# Patient Record
Sex: FEMALE | Race: WHITE | Employment: OTHER | URBAN - METROPOLITAN AREA
[De-identification: names, ages, dates, MRNs, and addresses within clinical notes are randomized per-mention and may not be internally consistent; named-entity substitution may affect disease eponyms.]

---

## 2017-12-27 ENCOUNTER — APPOINTMENT (OUTPATIENT)
Dept: GENERAL RADIOLOGY | Age: 72
End: 2017-12-27
Attending: EMERGENCY MEDICINE
Payer: MEDICARE

## 2017-12-27 ENCOUNTER — APPOINTMENT (OUTPATIENT)
Dept: CT IMAGING | Age: 72
End: 2017-12-27
Attending: EMERGENCY MEDICINE
Payer: MEDICARE

## 2017-12-27 ENCOUNTER — HOSPITAL ENCOUNTER (EMERGENCY)
Age: 72
Discharge: HOME | End: 2017-12-27
Attending: EMERGENCY MEDICINE
Payer: MEDICARE

## 2017-12-27 VITALS
WEIGHT: 158.95 LBS | SYSTOLIC BLOOD PRESSURE: 91 MMHG | DIASTOLIC BLOOD PRESSURE: 55 MMHG | BODY MASS INDEX: 26.48 KG/M2 | OXYGEN SATURATION: 96 % | TEMPERATURE: 97 F | RESPIRATION RATE: 16 BRPM | HEIGHT: 65 IN | HEART RATE: 59 BPM

## 2017-12-27 DIAGNOSIS — S42.291A OTHER CLOSED DISPLACED FRACTURE OF PROXIMAL END OF RIGHT HUMERUS, INITIAL ENCOUNTER: Primary | ICD-10-CM

## 2017-12-27 PROCEDURE — 73200 CT UPPER EXTREMITY W/O DYE: CPT

## 2017-12-27 PROCEDURE — 74011250636 HC RX REV CODE- 250/636: Performed by: EMERGENCY MEDICINE

## 2017-12-27 PROCEDURE — A4565 SLINGS: HCPCS

## 2017-12-27 PROCEDURE — 96372 THER/PROPH/DIAG INJ SC/IM: CPT

## 2017-12-27 PROCEDURE — 73030 X-RAY EXAM OF SHOULDER: CPT

## 2017-12-27 PROCEDURE — 99283 EMERGENCY DEPT VISIT LOW MDM: CPT

## 2017-12-27 PROCEDURE — 74011250637 HC RX REV CODE- 250/637: Performed by: EMERGENCY MEDICINE

## 2017-12-27 PROCEDURE — L3650 SO 8 ABD RESTRAINT PRE OTS: HCPCS

## 2017-12-27 RX ORDER — ONDANSETRON 4 MG/1
8 TABLET, ORALLY DISINTEGRATING ORAL
Status: COMPLETED | OUTPATIENT
Start: 2017-12-27 | End: 2017-12-27

## 2017-12-27 RX ORDER — ONDANSETRON 8 MG/1
8 TABLET, ORALLY DISINTEGRATING ORAL
Qty: 15 TAB | Refills: 0 | Status: SHIPPED | OUTPATIENT
Start: 2017-12-27

## 2017-12-27 RX ORDER — HYDROMORPHONE HYDROCHLORIDE 1 MG/ML
1 INJECTION, SOLUTION INTRAMUSCULAR; INTRAVENOUS; SUBCUTANEOUS
Status: COMPLETED | OUTPATIENT
Start: 2017-12-27 | End: 2017-12-27

## 2017-12-27 RX ORDER — OXYCODONE AND ACETAMINOPHEN 5; 325 MG/1; MG/1
1-2 TABLET ORAL
Qty: 30 TAB | Refills: 0 | Status: SHIPPED | OUTPATIENT
Start: 2017-12-27

## 2017-12-27 RX ADMIN — ONDANSETRON 8 MG: 4 TABLET, ORALLY DISINTEGRATING ORAL at 05:51

## 2017-12-27 RX ADMIN — HYDROMORPHONE HYDROCHLORIDE 1 MG: 1 INJECTION, SOLUTION INTRAMUSCULAR; INTRAVENOUS; SUBCUTANEOUS at 05:52

## 2017-12-27 NOTE — ED TRIAGE NOTES
Pt rpts falling tonight while playing with her puppy and injured her right shoulder. Took 3 Aleve PTA.

## 2017-12-27 NOTE — ED NOTES
The patient was discharged home by Dr Karyle Jakes in stable condition. The patient is alert and oriented, in no respiratory distress and discharge vital signs obtained. The patient's diagnosis, condition and treatment were explained. The patient expressed understanding. Prescriptions given. No work/school note given. A discharge plan has been developed. A  was not involved in the process. Aftercare instructions were given. Pt discharged from the ED via w/c by Papo Wyatt RN with family.

## 2017-12-27 NOTE — ED NOTES
Pt resting on the stretcher in no distress. All results available for review and awaiting further care mgmt. Dr Bonnie Palmer in to speak with pt/spouse regarding test results. Will continue to monitor.

## 2017-12-27 NOTE — DISCHARGE INSTRUCTIONS
We hope that we have addressed all of your medical concerns. The examination and treatment you received in the Emergency Department were for an emergent problem and were not intended as complete care. It is important that you follow up with your healthcare provider(s) for ongoing care. If your symptoms worsen or do not improve as expected, and you are unable to reach your usual health care provider(s), you should return to the Emergency Department. Today's healthcare is undergoing tremendous change, and patient satisfaction surveys are one of the many tools to assess the quality of medical care. You may receive a survey from the [employer] regarding your experience in the Emergency Department. I hope that your experience has been completely positive, particularly the medical care that I provided. As such, please participate in the survey; anything less than excellent does not meet my expectations or intentions. [address] [place] [address] and [address] [place] [address] participate in nationally recognized quality of care measures. If your blood pressure is greater than 120/80, as reported below, we urge that you seek medical care to address the potential of high blood pressure, commonly known as hypertension. Hypertension can be hereditary or can be caused by certain medical conditions, pain, stress, or \"white coat syndrome. \"       Please make an appointment with your health care provider(s) for follow up of your Emergency Department visit. VITALS:   Patient Vitals for the past 8 hrs:   Temp Pulse Resp BP SpO2   12/27/17 0442 97 °F (36.1 °C) 67 16 123/69 97 %          Thank you for allowing us to provide you with medical care today. We realize that you have many choices for your emergency care needs. Please choose us in the future for any continued health care needs. Felipe Jacinto MD    [facility], later [facility].   Office: [phone]            No results found for this or any previous visit (from the past 24 hour(s)). Xr Shoulder Rt Ap/lat Min 2 V    Result Date: 12/27/2017  EXAM:  XR SHOULDER RT AP/LAT MIN 2 V Clinical history: Shoulder trauma INDICATION:   Trauma. COMPARISON: None. FINDINGS: Three views of the right shoulder demonstrate displaced subcapital fracture. Inferomedial displacement of the distal fracture fragment. IMPRESSION:  Displaced proximal right humerus fracture. Broken Arm: Care Instructions  Your Care Instructions  Fractures can range from a small, hairline crack, to a bone or bones broken into two or more pieces. Your treatment depends on how bad the break is. Your doctor may have put your arm in a splint or cast to allow it to heal or to keep it stable until you see another doctor. It may take weeks or months for your arm to heal. You can help your arm heal with some care at home. You heal best when you take good care of yourself. Eat a variety of healthy foods, and don't smoke. You may have had a sedative to help you relax. You may be unsteady after having sedation. It can take a few hours for the medicine's effects to wear off. Common side effects of sedation include nausea, vomiting, and feeling sleepy or tired. The doctor has checked you carefully, but problems can develop later. If you notice any problems or new symptoms, get medical treatment right away. Follow-up care is a key part of your treatment and safety. Be sure to make and go to all appointments, and call your doctor if you are having problems. It's also a good idea to know your test results and keep a list of the medicines you take. How can you care for yourself at home? · If the doctor gave you a sedative:  ¨ For 24 hours, don't do anything that requires attention to detail. It takes time for the medicine's effects to completely wear off.   ¨ For your safety, do not drive or operate any machinery that could be dangerous. Wait until the medicine wears off and you can think clearly and react easily. · Put ice or a cold pack on your arm for 10 to 20 minutes at a time. Try to do this every 1 to 2 hours for the next 3 days (when you are awake). Put a thin cloth between the ice and your cast or splint. Keep the cast or splint dry. · Follow the cast care instructions your doctor gives you. If you have a splint, do not take it off unless your doctor tells you to. · Be safe with medicines. Take pain medicines exactly as directed. ¨ If the doctor gave you a prescription medicine for pain, take it as prescribed. ¨ If you are not taking a prescription pain medicine, ask your doctor if you can take an over-the-counter medicine. · Prop up your arm on pillows when you sit or lie down in the first few days after the injury. Keep the arm higher than the level of your heart. This will help reduce swelling. · Follow instructions for exercises to keep your arm strong. · Wiggle your fingers and wrist often to reduce swelling and stiffness. When should you call for help? Call 911 anytime you think you may need emergency care. For example, call if:  ? · You are very sleepy and you have trouble waking up. ?Call your doctor now or seek immediate medical care if:  ? · You have new or worse nausea or vomiting. ? · You have new or worse pain. ? · Your hand or fingers are cool or pale or change color. ? · Your cast or splint feels too tight. ? · You have tingling, weakness, or numbness in your hand or fingers. ? Watch closely for changes in your health, and be sure to contact your doctor if:  ? · You do not get better as expected. ? · You have problems with your cast or splint. Where can you learn more? Go to http://mango-phyllis.info/. Enter V424 in the search box to learn more about \"Broken Arm: Care Instructions. \"  Current as of: March 21, 2017  Content Version: 11.4  © 1439-0746 [employer]. Care instructions adapted under license by [employer] (which disclaims liability or warranty for this information). If you have questions about a medical condition or this instruction, always ask your healthcare professional. [address] any warranty or liability for your use of this information.

## 2017-12-27 NOTE — ED PROVIDER NOTES
HPI Comments: The patient is a 73-year-old female, who presents to the ED with a complaint of right arm and shoulder injury sustained after she fell while attempting to restrain her dog approximately 5 hours ago. The patient denies any loss of consciousness, headache, neck pain, back pain, chest pain, shortness of breath, nausea, vomiting, abdominal pain, diarrhea, constipation, dysuria, hematuria, dizziness, weakness, or numbness. The patient is right-handed. She has been taken Aleve at home without any significant relief of symptoms. Patient is a 71 y.o. female presenting with shoulder injury. Shoulder Injury           Past Medical History:   Diagnosis Date    Hypertension     Ill-defined condition     elevated cholesterol       History reviewed. No pertinent surgical history. History reviewed. No pertinent family history. Social History     Social History    Marital status:      Spouse name: N/A    Number of children: N/A    Years of education: N/A     Occupational History    Not on file. Social History Main Topics    Smoking status: Never Smoker    Smokeless tobacco: Never Used    Alcohol use No    Drug use: Not on file    Sexual activity: Not on file     Other Topics Concern    Not on file     Social History Narrative    No narrative on file         ALLERGIES: Review of patient's allergies indicates no known allergies. Review of Systems   All other systems reviewed and are negative. Vitals:    12/27/17 0442   BP: 123/69   Pulse: 67   Resp: 16   Temp: 97 °F (36.1 °C)   SpO2: 97%   Weight: 72.1 kg (158 lb 15.2 oz)   Height: 5' 5\" (1.651 m)            Physical Exam   Nursing note and vitals reviewed. CONSTITUTIONAL: Well-appearing; well-nourished; in mild distress  HEAD: Normocephalic; atraumatic  EYES: PERRL; EOM intact; conjunctiva and sclera are clear bilaterally.   ENT: No rhinorrhea; normal pharynx with no tonsillar hypertrophy; mucous membranes pink/moist, no erythema, no exudate. NECK: Supple; non-tender; no cervical lymphadenopathy  CARD: Normal S1, S2; no murmurs, rubs, or gallops. Regular rate and rhythm. RESP: Normal respiratory effort; breath sounds clear and equal bilaterally; no wheezes, rhonchi, or rales. ABD: Normal bowel sounds; non-distended; non-tender; no palpable organomegaly, no masses, no bruits. Back Exam: Normal inspection; no vertebral point tenderness, no CVA tenderness. Normal range of motion. EXT: Decreased ROM/ Tenderness in right shoulder with right arm held in adduction; no deformity or swelling; distal pulses are normal, no edema. SKIN: Warm; dry; no rash. NEURO:Alert and oriented x 3, coherent, TEODORO-XII grossly intact, sensory and motor are non-focal.        MDM  Number of Diagnoses or Management Options  Diagnosis management comments: Assessment: Right shoulder injury-rule out fracture, dislocation    Plan: Analgesia/ x-ray of the right shoulder/ sling/ splint/. Serial exam/ monitor and reevaluate          Amount and/or Complexity of Data Reviewed  Clinical lab tests: ordered and reviewed  Tests in the radiology section of CPT®: ordered and reviewed  Tests in the medicine section of CPT®: reviewed and ordered  Discussion of test results with the performing providers: yes  Decide to obtain previous medical records or to obtain history from someone other than the patient: yes  Obtain history from someone other than the patient: yes  Review and summarize past medical records: yes  Discuss the patient with other providers: yes    Risk of Complications, Morbidity, and/or Mortality  Presenting problems: low  Diagnostic procedures: low  Management options: low      ED Course       Procedures     XRAY INTERPRETATION (ED MD)  Xray of Right Shoulder shows Three views of the right shoulder demonstrate displaced subcapital  fracture. Inferomedial displacement of the distal fracture fragment.  Joselin Sainz MD 4:53 AM    CONSULT NOTE:  Subhash Crews Stefanie Madison MD spoke with Dr. Saintclair Gash of [facility] for Dr. Narinder Mccloud. Discussed patient's presentation, history, physical assessment, and available diagnostic results. Wants patient to get a CT scan of right shoulder, Sling and discharged home and will follow up in the office for outpatient reevaluation and work-up as deemed appropriate. Progress Note:   Pt has been reexamined by Josette Crump MD. Pt is feeling much better. Symptoms have improved. All available results have been reviewed with pt and any available family. Pt understands sx, dx, and tx in ED. Care plan has been outlined and questions have been answered. Pt is ready to go home. Will send home on *Right shoulder fracture instruction. Prescription of Percocet. Outpatient referral with Ortho in 1-2 days for reevaluation and further treatment including elective surgical repair. Written by Josette Crump MD,4:59 AM    .   .

## 2021-03-30 NOTE — ED NOTES
Additional treatment plan of care discussed and pt medicated per orders. Pt to CT. Solaraze Pregnancy And Lactation Text: This medication is Pregnancy Category B and is considered safe. There is some data to suggest avoiding during the third trimester. It is unknown if this medication is excreted in breast milk.

## 2022-11-19 ENCOUNTER — HOSPITAL ENCOUNTER (INPATIENT)
Age: 77
LOS: 6 days | Discharge: HOME HEALTH | DRG: 493 | End: 2022-11-25
Attending: EMERGENCY MEDICINE | Admitting: HOSPITALIST
Payer: MEDICARE

## 2022-11-19 ENCOUNTER — APPOINTMENT (OUTPATIENT)
Dept: CT IMAGING | Age: 77
DRG: 493 | End: 2022-11-19
Attending: PHYSICIAN ASSISTANT
Payer: MEDICARE

## 2022-11-19 ENCOUNTER — APPOINTMENT (OUTPATIENT)
Dept: GENERAL RADIOLOGY | Age: 77
DRG: 493 | End: 2022-11-19
Attending: EMERGENCY MEDICINE
Payer: MEDICARE

## 2022-11-19 DIAGNOSIS — S42.291A OTHER CLOSED DISPLACED FRACTURE OF PROXIMAL END OF RIGHT HUMERUS, INITIAL ENCOUNTER: ICD-10-CM

## 2022-11-19 DIAGNOSIS — S82.142A TIBIAL PLATEAU FRACTURE, LEFT, CLOSED, INITIAL ENCOUNTER: Primary | ICD-10-CM

## 2022-11-19 PROBLEM — S82.209A TIBIA FRACTURE: Status: ACTIVE | Noted: 2022-11-19

## 2022-11-19 LAB
ALBUMIN SERPL-MCNC: 3.8 G/DL (ref 3.5–5)
ALBUMIN/GLOB SERPL: 1 {RATIO} (ref 1.1–2.2)
ALP SERPL-CCNC: 72 U/L (ref 45–117)
ALT SERPL-CCNC: 23 U/L (ref 12–78)
ANION GAP SERPL CALC-SCNC: 5 MMOL/L (ref 5–15)
AST SERPL-CCNC: 21 U/L (ref 15–37)
BASOPHILS # BLD: 0 K/UL (ref 0–0.1)
BASOPHILS NFR BLD: 0 % (ref 0–1)
BILIRUB SERPL-MCNC: 0.9 MG/DL (ref 0.2–1)
BUN SERPL-MCNC: 13 MG/DL (ref 6–20)
BUN/CREAT SERPL: 17 (ref 12–20)
CALCIUM SERPL-MCNC: 9.6 MG/DL (ref 8.5–10.1)
CHLORIDE SERPL-SCNC: 104 MMOL/L (ref 97–108)
CO2 SERPL-SCNC: 29 MMOL/L (ref 21–32)
CREAT SERPL-MCNC: 0.75 MG/DL (ref 0.55–1.02)
DIFFERENTIAL METHOD BLD: ABNORMAL
EOSINOPHIL # BLD: 0.1 K/UL (ref 0–0.4)
EOSINOPHIL NFR BLD: 1 % (ref 0–7)
ERYTHROCYTE [DISTWIDTH] IN BLOOD BY AUTOMATED COUNT: 12.9 % (ref 11.5–14.5)
GLOBULIN SER CALC-MCNC: 3.7 G/DL (ref 2–4)
GLUCOSE SERPL-MCNC: 119 MG/DL (ref 65–100)
HCT VFR BLD AUTO: 39 % (ref 35–47)
HGB BLD-MCNC: 12.6 G/DL (ref 11.5–16)
IMM GRANULOCYTES # BLD AUTO: 0 K/UL (ref 0–0.04)
IMM GRANULOCYTES NFR BLD AUTO: 0 % (ref 0–0.5)
INR PPP: 1 (ref 0.9–1.1)
LYMPHOCYTES # BLD: 0.8 K/UL (ref 0.8–3.5)
LYMPHOCYTES NFR BLD: 10 % (ref 12–49)
MCH RBC QN AUTO: 30.5 PG (ref 26–34)
MCHC RBC AUTO-ENTMCNC: 32.3 G/DL (ref 30–36.5)
MCV RBC AUTO: 94.4 FL (ref 80–99)
MONOCYTES # BLD: 0.5 K/UL (ref 0–1)
MONOCYTES NFR BLD: 6 % (ref 5–13)
NEUTS SEG # BLD: 6.9 K/UL (ref 1.8–8)
NEUTS SEG NFR BLD: 83 % (ref 32–75)
NRBC # BLD: 0 K/UL (ref 0–0.01)
NRBC BLD-RTO: 0 PER 100 WBC
PLATELET # BLD AUTO: 216 K/UL (ref 150–400)
PMV BLD AUTO: 9.4 FL (ref 8.9–12.9)
POTASSIUM SERPL-SCNC: 3.8 MMOL/L (ref 3.5–5.1)
PROT SERPL-MCNC: 7.5 G/DL (ref 6.4–8.2)
PROTHROMBIN TIME: 10.2 SEC (ref 9–11.1)
RBC # BLD AUTO: 4.13 M/UL (ref 3.8–5.2)
SODIUM SERPL-SCNC: 138 MMOL/L (ref 136–145)
WBC # BLD AUTO: 8.4 K/UL (ref 3.6–11)

## 2022-11-19 PROCEDURE — 99285 EMERGENCY DEPT VISIT HI MDM: CPT

## 2022-11-19 PROCEDURE — 73562 X-RAY EXAM OF KNEE 3: CPT

## 2022-11-19 PROCEDURE — 85025 COMPLETE CBC W/AUTO DIFF WBC: CPT

## 2022-11-19 PROCEDURE — 80053 COMPREHEN METABOLIC PANEL: CPT

## 2022-11-19 PROCEDURE — 65270000029 HC RM PRIVATE

## 2022-11-19 PROCEDURE — 86900 BLOOD TYPING SEROLOGIC ABO: CPT

## 2022-11-19 PROCEDURE — 74011250636 HC RX REV CODE- 250/636: Performed by: EMERGENCY MEDICINE

## 2022-11-19 PROCEDURE — 93005 ELECTROCARDIOGRAM TRACING: CPT

## 2022-11-19 PROCEDURE — 96374 THER/PROPH/DIAG INJ IV PUSH: CPT

## 2022-11-19 PROCEDURE — 85610 PROTHROMBIN TIME: CPT

## 2022-11-19 PROCEDURE — 73700 CT LOWER EXTREMITY W/O DYE: CPT

## 2022-11-19 PROCEDURE — 77030036660

## 2022-11-19 PROCEDURE — 74011250637 HC RX REV CODE- 250/637: Performed by: HOSPITALIST

## 2022-11-19 PROCEDURE — 36415 COLL VENOUS BLD VENIPUNCTURE: CPT

## 2022-11-19 RX ORDER — FENTANYL CITRATE 50 UG/ML
100 INJECTION, SOLUTION INTRAMUSCULAR; INTRAVENOUS ONCE
Status: COMPLETED | OUTPATIENT
Start: 2022-11-19 | End: 2022-11-19

## 2022-11-19 RX ORDER — OXYCODONE AND ACETAMINOPHEN 5; 325 MG/1; MG/1
1 TABLET ORAL
Status: DISCONTINUED | OUTPATIENT
Start: 2022-11-19 | End: 2022-11-20

## 2022-11-19 RX ORDER — OXYCODONE AND ACETAMINOPHEN 5; 325 MG/1; MG/1
1-2 TABLET ORAL
Status: DISCONTINUED | OUTPATIENT
Start: 2022-11-19 | End: 2022-11-19

## 2022-11-19 RX ORDER — VALSARTAN 80 MG/1
160 TABLET ORAL DAILY
Status: DISCONTINUED | OUTPATIENT
Start: 2022-11-20 | End: 2022-11-25 | Stop reason: HOSPADM

## 2022-11-19 RX ORDER — ATORVASTATIN CALCIUM 10 MG/1
10 TABLET, FILM COATED ORAL
Status: DISCONTINUED | OUTPATIENT
Start: 2022-11-19 | End: 2022-11-25 | Stop reason: HOSPADM

## 2022-11-19 RX ORDER — HYDROMORPHONE HYDROCHLORIDE 1 MG/ML
1 INJECTION, SOLUTION INTRAMUSCULAR; INTRAVENOUS; SUBCUTANEOUS
Status: DISCONTINUED | OUTPATIENT
Start: 2022-11-19 | End: 2022-11-25 | Stop reason: HOSPADM

## 2022-11-19 RX ORDER — HYDROMORPHONE HYDROCHLORIDE 1 MG/ML
1 INJECTION, SOLUTION INTRAMUSCULAR; INTRAVENOUS; SUBCUTANEOUS ONCE
Status: COMPLETED | OUTPATIENT
Start: 2022-11-19 | End: 2022-11-19

## 2022-11-19 RX ORDER — ONDANSETRON 2 MG/ML
4 INJECTION INTRAMUSCULAR; INTRAVENOUS
Status: DISCONTINUED | OUTPATIENT
Start: 2022-11-19 | End: 2022-11-25 | Stop reason: HOSPADM

## 2022-11-19 RX ADMIN — FENTANYL CITRATE 100 MCG: 50 INJECTION, SOLUTION INTRAMUSCULAR; INTRAVENOUS at 15:52

## 2022-11-19 RX ADMIN — OXYCODONE AND ACETAMINOPHEN 1 TABLET: 5; 325 TABLET ORAL at 20:22

## 2022-11-19 RX ADMIN — HYDROMORPHONE HYDROCHLORIDE 1 MG: 1 INJECTION, SOLUTION INTRAMUSCULAR; INTRAVENOUS; SUBCUTANEOUS at 16:53

## 2022-11-19 RX ADMIN — ATORVASTATIN CALCIUM 10 MG: 10 TABLET, FILM COATED ORAL at 20:22

## 2022-11-19 NOTE — H&P
Stefan Olguin [facility] [place] [address]  HISTORY AND PHYSICAL    Name:  Brii Carranza  MR#:  [id number]  :  1945  ACCOUNT #:  [de-identified]  ADMIT DATE:  2022    PRIMARY CARE PHYSICIAN:  Unknown. PRESENTING COMPLAINT:  Left knee pain. HISTORY OF PRESENT ILLNESS:      The patient is a 42-year-old female who has previous history significant for hypertension and hyperlipidemia, who came to the emergency room due to left knee pain. The patient stated that she was playing  with her dog when she got run into other [place] retriever and was knocked to the ground. The patient presented to ER due to left knee pain. According to the EMS, the patient's left knee had a valgus deformity to the left knee. The patient was given a dose of fentanyl. In the emergency room, an x-ray was done which showed fracture of proximal tibia and fibula. PAST MEDICAL HISTORY:      1. Hypertension. 2.  Hyper lipidemia. MEDICATIONS PRIOR TO ADMISSION:  Include:  1. Lipitor 10 mg daily. 2.  Candesartan one tablet 16 mg daily. ALLERGIES:  NO KNOWN DRUG ALLERGIES. SOCIOECONOMIC HISTORY:  The patient does not smoke or use any drugs. She drinks socially. She is . FAMILY HISTORY:  Significant for diabetes type 1 and congestive heart failure. REVIEW OF SYSTEMS:  Review of systems is negative except as mentioned in history presenting illness. All system are reviewed. No other positive finding was noticed. PHYSICAL EXAMINATION:    GENERAL APPEARANCE:  The patient is a 42-year-old female not in any acute distress. VITAL SIGNS:  Temperature 98.9, blood pressure is 132/60, pulse is 60, respiratory rate is 18, saturation is 100%. HEENT:  Pupils equally reactive to light and accommodation. NECK:  Supple. There is no lymphadenopathy or JVD. CHEST:  Chest is clear. No wheezing, no crackles. CARDIOVASCULAR SYSTEM:  S1 and S2 regular. No murmur.   No S3.  ABDOMEN:  Abdominal examination reveals no tenderness, no guarding, no rigidity. Bowel sounds are active. EXTREMITIES:  Extremity examination reveals tenderness in the left knee area. DIAGNOSTIC DATA:  Labs are not done. X-ray shows fracture of proximal tibia and fibula. ASSESSMENT AND PLAN:      The patient is a 71-year-old lady with previous history significant for hypertension, hyperlipidemia, is admitted due to impacted tibial plateau fracture as seen on x-ray. Orthopedic has already been consulted. 1.  The patient will be started on a knee immobilizer and not weigh bearing. 2.  Pain control. 3.  Possibly OR tomorrow. 4.  Obtain baseline EKG and labs. 5.  History of hypertension. We will continue on current medication. 6.  Hyperlipidemia. We will continue on statin. 7.  DVT prophylaxis.       Edilma Virgen MD      [email or web address]  D:  11/19/2022 16:51  T:  11/19/2022 18:01  JOB #:  [id number]

## 2022-11-19 NOTE — CONSULTS
ORTHOPEDIC CONSULT    Subjective:     Date of Consultation:  November 19, 2022    Referring Physician:  Dr. Crystal Yanes is a 77 y.o.  female who is being seen for left knee pain. She was playing with her 79 lb Elesa Ceci Retriever earlier today and was knocked to the ground. Brought to ED by EMS and workup has revealed depressed left tibial plateau and fibular head fracture. She is otherwise healthy and only takes medication for HTN and hyperlipidemia. Denies any LOC, head trauma, CP/SOB, N/V or numbness/tingling. Her  is present with her today. She does take ASA 81mg daily. Patient Active Problem List    Diagnosis Date Noted    Tibia fracture 11/19/2022     No family history on file. Social History     Tobacco Use    Smoking status: Never    Smokeless tobacco: Never   Substance Use Topics    Alcohol use: No     Past Medical History:   Diagnosis Date    Hypertension     Ill-defined condition     elevated cholesterol      No past surgical history on file. Prior to Admission medications    Medication Sig Start Date End Date Taking? Authorizing Provider   IRBESARTAN PO Take  by mouth daily. eNvin Durand MD   ATORVASTATIN CALCIUM (ATORVASTATIN PO) Take  by mouth nightly. Nevin Durand MD   oxyCODONE-acetaminophen (PERCOCET) 5-325 mg per tablet Take 1-2 Tabs by mouth every four (4) hours as needed for Pain. Max Daily Amount: 12 Tabs. 12/27/17   Ant Elise MD   ondansetron (ZOFRAN ODT) 8 mg disintegrating tablet Take 1 Tab by mouth every eight (8) hours as needed for Nausea.  12/27/17   Ant Elise MD     Current Facility-Administered Medications   Medication Dose Route Frequency    atorvastatin (LIPITOR) tablet 10 mg  10 mg Oral QHS    [START ON 11/20/2022] valsartan (DIOVAN) tablet 160 mg  160 mg Oral DAILY    ondansetron (ZOFRAN) injection 4 mg  4 mg IntraVENous Q6H PRN    oxyCODONE-acetaminophen (PERCOCET) 5-325 mg per tablet 1 Tablet  1 Tablet Oral Q4H PRN    Or oxyCODONE-acetaminophen (PERCOCET) 5-325 mg per tablet 1 Tablet  1 Tablet Oral Q4H PRN     Current Outpatient Medications   Medication Sig    IRBESARTAN PO Take  by mouth daily. ATORVASTATIN CALCIUM (ATORVASTATIN PO) Take  by mouth nightly. oxyCODONE-acetaminophen (PERCOCET) 5-325 mg per tablet Take 1-2 Tabs by mouth every four (4) hours as needed for Pain. Max Daily Amount: 12 Tabs. ondansetron (ZOFRAN ODT) 8 mg disintegrating tablet Take 1 Tab by mouth every eight (8) hours as needed for Nausea. No Known Allergies     Review of Systems:  A comprehensive review of systems was negative except for that written in the HPI. Objective:     Patient Vitals for the past 8 hrs:   BP Temp Pulse Resp SpO2 Height Weight   22 1800 (!) 121/49 98.4 °F (36.9 °C) 60 16 96 % -- --   22 1547 (!) 132/59 98.9 °F (37.2 °C) (!) 58 18 100 % 5' 5\" (1.651 m) 68 kg (150 lb)     Temp (24hrs), Av.7 °F (37.1 °C), Min:98.4 °F (36.9 °C), Max:98.9 °F (37.2 °C)      Physical Exam  Constitutional:       Appearance:  well-developed, well nourished  HENT:      Head: Normocephalic and atraumatic. Nose: Nose normal.       Mouth: Mucous membranes are moist.   Eyes:      Extraocular Movements:  intact. Conjunctiva/sclera:  normal.   Cardiovascular:      Rate and Rhythm: Normal rate and regular rhythm. Good peripheral pulses  Pulmonary:      Effort: Pulmonary effort is normal. No respiratory distress. Abdominal:      General: non-distended. Musculoskeletal:        Left Leg: Valgus deformity present with knee immobilizer in place. Flexed to 25 degrees and unable to reposition. Mild edema/ mild effusion. No ecchymosis. SILT, + DP/PT pulses. SILT. +PF/DF/EHL. Skin:     General: Skin is warm. Capillary Refill: Capillary refill takes less than 3 seconds. Neurological:      General: No focal deficit present. Mental Status: She is alert and oriented to person, place, and time.       Comments: No gross motor or sensory deficits       Data Review   Recent Results (from the past 24 hour(s))   CBC WITH AUTOMATED DIFF    Collection Time: 11/19/22  5:15 PM   Result Value Ref Range    WBC 8.4 3.6 - 11.0 K/uL    RBC 4.13 3.80 - 5.20 M/uL    HGB 12.6 11.5 - 16.0 g/dL    HCT 39.0 35.0 - 47.0 %    MCV 94.4 80.0 - 99.0 FL    MCH 30.5 26.0 - 34.0 PG    MCHC 32.3 30.0 - 36.5 g/dL    RDW 12.9 11.5 - 14.5 %    PLATELET 489 940 - 608 K/uL    MPV 9.4 8.9 - 12.9 FL    NRBC 0.0 0  WBC    ABSOLUTE NRBC 0.00 0.00 - 0.01 K/uL    NEUTROPHILS 83 (H) 32 - 75 %    LYMPHOCYTES 10 (L) 12 - 49 %    MONOCYTES 6 5 - 13 %    EOSINOPHILS 1 0 - 7 %    BASOPHILS 0 0 - 1 %    IMMATURE GRANULOCYTES 0 0.0 - 0.5 %    ABS. NEUTROPHILS 6.9 1.8 - 8.0 K/UL    ABS. LYMPHOCYTES 0.8 0.8 - 3.5 K/UL    ABS. MONOCYTES 0.5 0.0 - 1.0 K/UL    ABS. EOSINOPHILS 0.1 0.0 - 0.4 K/UL    ABS. BASOPHILS 0.0 0.0 - 0.1 K/UL    ABS. IMM. GRANS. 0.0 0.00 - 0.04 K/UL    DF AUTOMATED     METABOLIC PANEL, COMPREHENSIVE    Collection Time: 11/19/22  5:15 PM   Result Value Ref Range    Sodium 138 136 - 145 mmol/L    Potassium 3.8 3.5 - 5.1 mmol/L    Chloride 104 97 - 108 mmol/L    CO2 29 21 - 32 mmol/L    Anion gap 5 5 - 15 mmol/L    Glucose 119 (H) 65 - 100 mg/dL    BUN 13 6 - 20 MG/DL    Creatinine 0.75 0.55 - 1.02 MG/DL    BUN/Creatinine ratio 17 12 - 20      eGFR >60 >60 ml/min/1.73m2    Calcium 9.6 8.5 - 10.1 MG/DL    Bilirubin, total 0.9 0.2 - 1.0 MG/DL    ALT (SGPT) 23 12 - 78 U/L    AST (SGOT) 21 15 - 37 U/L    Alk.  phosphatase 72 45 - 117 U/L    Protein, total 7.5 6.4 - 8.2 g/dL    Albumin 3.8 3.5 - 5.0 g/dL    Globulin 3.7 2.0 - 4.0 g/dL    A-G Ratio 1.0 (L) 1.1 - 2.2     PROTHROMBIN TIME + INR    Collection Time: 11/19/22  5:15 PM   Result Value Ref Range    INR 1.0 0.9 - 1.1      Prothrombin time 10.2 9.0 - 11.1 sec   EKG, 12 LEAD, INITIAL    Collection Time: 11/19/22  5:32 PM   Result Value Ref Range    Ventricular Rate 66 BPM    Atrial Rate 66 BPM    P-R Interval 202 ms    QRS Duration 94 ms    Q-T Interval 386 ms    QTC Calculation (Bezet) 404 ms    Calculated P Axis 41 degrees    Calculated R Axis -23 degrees    Calculated T Axis 37 degrees    Diagnosis       Sinus rhythm with premature atrial complexes  Incomplete right bundle branch block  Nonspecific T wave abnormality  Abnormal ECG  No previous ECGs available           Assessment/Plan:     Assessment:  Left depressed tibial plateau fracture with fibula head fracture  HTN/ Hyperlipidemia    Plan:  Admit to medicine - appreciate pre-op labs and work-up  CT LLE tonight for surgical planning  ORIF tomorrow - timing depending on OR availability  NWB LLE, knee immobilizer  NPO p MN  HOLD DVT chemoprophylaxis, OK for SCD on RLE    Pain control: PO percocet, added IV dilaudid   Dispo: pending PT/OT evals post-op    I have discussed the above findings and plan of care with Dr. Kt Chavez and he agrees.     Aleida Rodriguez PA-C  [facility]  [address] [facility]

## 2022-11-19 NOTE — ED PROVIDER NOTES
78-year-old female presents from home via EMS with a complaint of injury to her left knee. Patient states she was playing fetch with her dog when she got run into other husain retriever knocked to the ground. She had a deformity and pain to her left knee. EMS states that the left knee had a valgus deformity to the left knee. Patient denies any previous injury or surgery to the left knee. EMS gave 100 mics of fentanyl. They add that the deformity has improved significantly since transferring the patient onto the EMS stretcher. Patient denies any head injury or loss of consciousness or other injuries as result of the fall. Past Medical History:   Diagnosis Date    Hypertension     Ill-defined condition     elevated cholesterol       No past surgical history on file. No family history on file. Social History     Socioeconomic History    Marital status:      Spouse name: Not on file    Number of children: Not on file    Years of education: Not on file    Highest education level: Not on file   Occupational History    Not on file   Tobacco Use    Smoking status: Never    Smokeless tobacco: Never   Substance and Sexual Activity    Alcohol use: No    Drug use: Not on file    Sexual activity: Not on file   Other Topics Concern    Not on file   Social History Narrative    Not on file     Social Determinants of Health     Financial Resource Strain: Not on file   Food Insecurity: Not on file   Transportation Needs: Not on file   Physical Activity: Not on file   Stress: Not on file   Social Connections: Not on file   Intimate Partner Violence: Not on file   Housing Stability: Not on file         ALLERGIES: Patient has no known allergies. Review of Systems   Constitutional:  Negative for fever. HENT:  Negative for facial swelling. Eyes:  Negative for visual disturbance. Respiratory:  Negative for chest tightness. Cardiovascular:  Negative for chest pain.    Gastrointestinal:  Negative for abdominal pain. Genitourinary:  Negative for difficulty urinating and dysuria. Musculoskeletal:  Negative for arthralgias. Skin:  Negative for rash. Neurological:  Negative for headaches. Hematological:  Negative for adenopathy. Psychiatric/Behavioral:  Negative for suicidal ideas. There were no vitals filed for this visit. Physical Exam  Vitals and nursing note reviewed. Constitutional:       General: She is not in acute distress. Appearance: She is well-developed. HENT:      Head: Normocephalic and atraumatic. Eyes:      General: No scleral icterus. Conjunctiva/sclera: Conjunctivae normal.      Pupils: Pupils are equal, round, and reactive to light. Cardiovascular:      Rate and Rhythm: Normal rate. Heart sounds: No murmur heard. Pulmonary:      Effort: Pulmonary effort is normal. No respiratory distress. Abdominal:      General: There is no distension. Musculoskeletal:         General: Normal range of motion. Cervical back: Normal range of motion and neck supple. Skin:     General: Skin is warm and dry. Findings: No rash. Neurological:      Mental Status: She is alert and oriented to person, place, and time. MDM  Number of Diagnoses or Management Options  Tibial plateau fracture, left, closed, initial encounter  Diagnosis management comments: Assessment: Patient with evidence of impacted tibial plateau on x-ray. I spoke with orthopedic these [facility] and he recommended a knee immobilizer and nonweightbearing. Possibly going to the OR tomorrow. They recommended admission to the hospitalist service. Amount and/or Complexity of Data Reviewed  Tests in the radiology section of CPT®: reviewed         [facility] Consult for Admission  4:37 PM    ED Room Number: ER10/10  Patient Name and age:  Rinku Lawson 77 y.o.  female  Working Diagnosis:   1.  Tibial plateau fracture, left, closed, initial encounter        COVID-19 Suspicion: no  Sepsis present:  no  Reassessment needed: no  Code Status:  Full Code  Readmission: no  Isolation Requirements:  no  Recommended Level of Care:  med/surg  Department: [address] [place] [address] ED - [phone]  Admitting Provider:     Other:  L tibial plateau fracture after colliding with Alan Card. Ortho said knee immobilizer and NWB. Likely OR tomorrow. Total critical care time spent exclusive of procedures:  0 minutes.     Procedures

## 2022-11-19 NOTE — ED TRIAGE NOTES
Patient reports that her husain retriever ran into her left knee and caused her knee to shift. EMS ave 100mcg of fentanyl IV en route.

## 2022-11-20 ENCOUNTER — ANESTHESIA EVENT (OUTPATIENT)
Dept: SURGERY | Age: 77
DRG: 493 | End: 2022-11-20
Payer: MEDICARE

## 2022-11-20 ENCOUNTER — APPOINTMENT (OUTPATIENT)
Dept: GENERAL RADIOLOGY | Age: 77
DRG: 493 | End: 2022-11-20
Attending: HOSPITALIST
Payer: MEDICARE

## 2022-11-20 ENCOUNTER — ANESTHESIA (OUTPATIENT)
Dept: SURGERY | Age: 77
DRG: 493 | End: 2022-11-20
Payer: MEDICARE

## 2022-11-20 LAB
ABO + RH BLD: NORMAL
ANION GAP SERPL CALC-SCNC: 5 MMOL/L (ref 5–15)
BLOOD GROUP ANTIBODIES SERPL: NORMAL
BUN SERPL-MCNC: 10 MG/DL (ref 6–20)
BUN/CREAT SERPL: 18 (ref 12–20)
CALCIUM SERPL-MCNC: 9.3 MG/DL (ref 8.5–10.1)
CHLORIDE SERPL-SCNC: 103 MMOL/L (ref 97–108)
CO2 SERPL-SCNC: 28 MMOL/L (ref 21–32)
CREAT SERPL-MCNC: 0.56 MG/DL (ref 0.55–1.02)
ERYTHROCYTE [DISTWIDTH] IN BLOOD BY AUTOMATED COUNT: 13.1 % (ref 11.5–14.5)
GLUCOSE SERPL-MCNC: 116 MG/DL (ref 65–100)
HCT VFR BLD AUTO: 35.7 % (ref 35–47)
HGB BLD-MCNC: 11.6 G/DL (ref 11.5–16)
MCH RBC QN AUTO: 30.8 PG (ref 26–34)
MCHC RBC AUTO-ENTMCNC: 32.5 G/DL (ref 30–36.5)
MCV RBC AUTO: 94.7 FL (ref 80–99)
NRBC # BLD: 0 K/UL (ref 0–0.01)
NRBC BLD-RTO: 0 PER 100 WBC
PLATELET # BLD AUTO: 229 K/UL (ref 150–400)
PMV BLD AUTO: 9.1 FL (ref 8.9–12.9)
POTASSIUM SERPL-SCNC: 3.9 MMOL/L (ref 3.5–5.1)
RBC # BLD AUTO: 3.77 M/UL (ref 3.8–5.2)
SODIUM SERPL-SCNC: 136 MMOL/L (ref 136–145)
SPECIMEN EXP DATE BLD: NORMAL
WBC # BLD AUTO: 6.1 K/UL (ref 3.6–11)

## 2022-11-20 PROCEDURE — C1713 ANCHOR/SCREW BN/BN,TIS/BN: HCPCS | Performed by: ORTHOPAEDIC SURGERY

## 2022-11-20 PROCEDURE — 77030018673: Performed by: ORTHOPAEDIC SURGERY

## 2022-11-20 PROCEDURE — 0QSH04Z REPOSITION LEFT TIBIA WITH INTERNAL FIXATION DEVICE, OPEN APPROACH: ICD-10-PCS | Performed by: ORTHOPAEDIC SURGERY

## 2022-11-20 PROCEDURE — 65270000029 HC RM PRIVATE

## 2022-11-20 PROCEDURE — 77030008684 HC TU ET CUF COVD -B: Performed by: ANESTHESIOLOGY

## 2022-11-20 PROCEDURE — 77030026438 HC STYL ET INTUB CARD -A: Performed by: ANESTHESIOLOGY

## 2022-11-20 PROCEDURE — 74011250637 HC RX REV CODE- 250/637: Performed by: PHYSICIAN ASSISTANT

## 2022-11-20 PROCEDURE — 74011250636 HC RX REV CODE- 250/636: Performed by: ORTHOPAEDIC SURGERY

## 2022-11-20 PROCEDURE — 77030008771 HC TU NG SALEM SUMP -A: Performed by: ANESTHESIOLOGY

## 2022-11-20 PROCEDURE — 74011000250 HC RX REV CODE- 250: Performed by: NURSE ANESTHETIST, CERTIFIED REGISTERED

## 2022-11-20 PROCEDURE — 77030013079 HC BLNKT BAIR HGGR 3M -A: Performed by: ANESTHESIOLOGY

## 2022-11-20 PROCEDURE — 77030038269 HC DRN EXT URIN PURWCK BARD -A

## 2022-11-20 PROCEDURE — 77030026188 HC BN CANC CHP CRSH PR LIFV -E: Performed by: ORTHOPAEDIC SURGERY

## 2022-11-20 PROCEDURE — 74011250637 HC RX REV CODE- 250/637: Performed by: HOSPITALIST

## 2022-11-20 PROCEDURE — 76060000039 HC ANESTHESIA 4 TO 4.5 HR: Performed by: ORTHOPAEDIC SURGERY

## 2022-11-20 PROCEDURE — 77030010507 HC ADH SKN DERMBND J&J -B: Performed by: ORTHOPAEDIC SURGERY

## 2022-11-20 PROCEDURE — 80048 BASIC METABOLIC PNL TOTAL CA: CPT

## 2022-11-20 PROCEDURE — 77030000032 HC CUF TRNQT ZIMM -B: Performed by: ORTHOPAEDIC SURGERY

## 2022-11-20 PROCEDURE — 74011250636 HC RX REV CODE- 250/636: Performed by: NURSE ANESTHETIST, CERTIFIED REGISTERED

## 2022-11-20 PROCEDURE — 74011000250 HC RX REV CODE- 250: Performed by: PHYSICIAN ASSISTANT

## 2022-11-20 PROCEDURE — C1769 GUIDE WIRE: HCPCS | Performed by: ORTHOPAEDIC SURGERY

## 2022-11-20 PROCEDURE — 76210000006 HC OR PH I REC 0.5 TO 1 HR: Performed by: ORTHOPAEDIC SURGERY

## 2022-11-20 PROCEDURE — 74011250636 HC RX REV CODE- 250/636: Performed by: PHYSICIAN ASSISTANT

## 2022-11-20 PROCEDURE — 77030032758 HC BIT DRL DISP STRY -D: Performed by: ORTHOPAEDIC SURGERY

## 2022-11-20 PROCEDURE — 76010000135 HC OR TIME 4 TO 4.5 HR: Performed by: ORTHOPAEDIC SURGERY

## 2022-11-20 PROCEDURE — 77030028907 HC WRP KNEE WO BGS SOLM -B

## 2022-11-20 PROCEDURE — 77030002933 HC SUT MCRYL J&J -A: Performed by: ORTHOPAEDIC SURGERY

## 2022-11-20 PROCEDURE — 74011250636 HC RX REV CODE- 250/636: Performed by: ANESTHESIOLOGY

## 2022-11-20 PROCEDURE — 77030031139 HC SUT VCRL2 J&J -A: Performed by: ORTHOPAEDIC SURGERY

## 2022-11-20 PROCEDURE — 85027 COMPLETE CBC AUTOMATED: CPT

## 2022-11-20 PROCEDURE — 36415 COLL VENOUS BLD VENIPUNCTURE: CPT

## 2022-11-20 PROCEDURE — 77030020268 HC MISC GENERAL SUPPLY: Performed by: ORTHOPAEDIC SURGERY

## 2022-11-20 PROCEDURE — 77030020274 HC MISC IMPL ORTHOPEDIC: Performed by: ORTHOPAEDIC SURGERY

## 2022-11-20 PROCEDURE — 2709999900 HC NON-CHARGEABLE SUPPLY: Performed by: ORTHOPAEDIC SURGERY

## 2022-11-20 DEVICE — CANCELLOUS SCREW
Type: IMPLANTABLE DEVICE | Site: KNEE | Status: FUNCTIONAL
Brand: AXSOS

## 2022-11-20 DEVICE — LOCKING SCREW
Type: IMPLANTABLE DEVICE | Site: KNEE | Status: FUNCTIONAL
Brand: AXSOS

## 2022-11-20 DEVICE — NEWPORT TSC 4MM HEX SCREW, DIA 6.0 X 60 MM
Type: IMPLANTABLE DEVICE | Site: KNEE | Status: FUNCTIONAL
Brand: NEWPORT

## 2022-11-20 DEVICE — CORTEX SCREW
Type: IMPLANTABLE DEVICE | Site: KNEE | Status: FUNCTIONAL
Brand: AXSOS

## 2022-11-20 DEVICE — PROXIMAL LATERAL TIBIA PLATE
Type: IMPLANTABLE DEVICE | Site: KNEE | Status: FUNCTIONAL
Brand: AXSOS

## 2022-11-20 DEVICE — BONE CHIP CANC CRSH 1-8MM 30ML --: Type: IMPLANTABLE DEVICE | Site: KNEE | Status: FUNCTIONAL

## 2022-11-20 RX ORDER — GLYCOPYRROLATE 0.2 MG/ML
INJECTION INTRAMUSCULAR; INTRAVENOUS AS NEEDED
Status: DISCONTINUED | OUTPATIENT
Start: 2022-11-20 | End: 2022-11-20 | Stop reason: HOSPADM

## 2022-11-20 RX ORDER — OXYCODONE AND ACETAMINOPHEN 5; 325 MG/1; MG/1
1 TABLET ORAL
Status: DISCONTINUED | OUTPATIENT
Start: 2022-11-20 | End: 2022-11-25 | Stop reason: HOSPADM

## 2022-11-20 RX ORDER — ONDANSETRON 2 MG/ML
INJECTION INTRAMUSCULAR; INTRAVENOUS AS NEEDED
Status: DISCONTINUED | OUTPATIENT
Start: 2022-11-20 | End: 2022-11-20 | Stop reason: HOSPADM

## 2022-11-20 RX ORDER — ROCURONIUM BROMIDE 10 MG/ML
INJECTION, SOLUTION INTRAVENOUS AS NEEDED
Status: DISCONTINUED | OUTPATIENT
Start: 2022-11-20 | End: 2022-11-20 | Stop reason: HOSPADM

## 2022-11-20 RX ORDER — SODIUM CHLORIDE, SODIUM LACTATE, POTASSIUM CHLORIDE, CALCIUM CHLORIDE 600; 310; 30; 20 MG/100ML; MG/100ML; MG/100ML; MG/100ML
100 INJECTION, SOLUTION INTRAVENOUS CONTINUOUS
Status: DISCONTINUED | OUTPATIENT
Start: 2022-11-20 | End: 2022-11-20 | Stop reason: HOSPADM

## 2022-11-20 RX ORDER — VANCOMYCIN HYDROCHLORIDE 1 G/20ML
INJECTION, POWDER, LYOPHILIZED, FOR SOLUTION INTRAVENOUS AS NEEDED
Status: DISCONTINUED | OUTPATIENT
Start: 2022-11-20 | End: 2022-11-20 | Stop reason: HOSPADM

## 2022-11-20 RX ORDER — HYDROMORPHONE HYDROCHLORIDE 1 MG/ML
.25-1 INJECTION, SOLUTION INTRAMUSCULAR; INTRAVENOUS; SUBCUTANEOUS
Status: DISCONTINUED | OUTPATIENT
Start: 2022-11-20 | End: 2022-11-20 | Stop reason: HOSPADM

## 2022-11-20 RX ORDER — FENTANYL CITRATE 50 UG/ML
INJECTION, SOLUTION INTRAMUSCULAR; INTRAVENOUS AS NEEDED
Status: DISCONTINUED | OUTPATIENT
Start: 2022-11-20 | End: 2022-11-20 | Stop reason: HOSPADM

## 2022-11-20 RX ORDER — SUCCINYLCHOLINE CHLORIDE 20 MG/ML
INJECTION INTRAMUSCULAR; INTRAVENOUS AS NEEDED
Status: DISCONTINUED | OUTPATIENT
Start: 2022-11-20 | End: 2022-11-20 | Stop reason: HOSPADM

## 2022-11-20 RX ORDER — HYDROMORPHONE HYDROCHLORIDE 2 MG/ML
INJECTION, SOLUTION INTRAMUSCULAR; INTRAVENOUS; SUBCUTANEOUS AS NEEDED
Status: DISCONTINUED | OUTPATIENT
Start: 2022-11-20 | End: 2022-11-20 | Stop reason: HOSPADM

## 2022-11-20 RX ORDER — NEOSTIGMINE METHYLSULFATE 1 MG/ML
INJECTION, SOLUTION INTRAVENOUS AS NEEDED
Status: DISCONTINUED | OUTPATIENT
Start: 2022-11-20 | End: 2022-11-20 | Stop reason: HOSPADM

## 2022-11-20 RX ORDER — PROPOFOL 10 MG/ML
INJECTION, EMULSION INTRAVENOUS AS NEEDED
Status: DISCONTINUED | OUTPATIENT
Start: 2022-11-20 | End: 2022-11-20 | Stop reason: HOSPADM

## 2022-11-20 RX ORDER — LIDOCAINE HYDROCHLORIDE 20 MG/ML
INJECTION, SOLUTION EPIDURAL; INFILTRATION; INTRACAUDAL; PERINEURAL AS NEEDED
Status: DISCONTINUED | OUTPATIENT
Start: 2022-11-20 | End: 2022-11-20 | Stop reason: HOSPADM

## 2022-11-20 RX ORDER — OXYCODONE AND ACETAMINOPHEN 5; 325 MG/1; MG/1
2 TABLET ORAL
Status: DISCONTINUED | OUTPATIENT
Start: 2022-11-20 | End: 2022-11-25 | Stop reason: HOSPADM

## 2022-11-20 RX ORDER — SODIUM CHLORIDE 9 MG/ML
125 INJECTION, SOLUTION INTRAVENOUS CONTINUOUS
Status: DISPENSED | OUTPATIENT
Start: 2022-11-20 | End: 2022-11-21

## 2022-11-20 RX ORDER — ENOXAPARIN SODIUM 100 MG/ML
40 INJECTION SUBCUTANEOUS EVERY 24 HOURS
Status: DISCONTINUED | OUTPATIENT
Start: 2022-11-21 | End: 2022-11-25 | Stop reason: HOSPADM

## 2022-11-20 RX ORDER — SODIUM CHLORIDE, SODIUM LACTATE, POTASSIUM CHLORIDE, CALCIUM CHLORIDE 600; 310; 30; 20 MG/100ML; MG/100ML; MG/100ML; MG/100ML
INJECTION, SOLUTION INTRAVENOUS
Status: DISCONTINUED | OUTPATIENT
Start: 2022-11-20 | End: 2022-11-20 | Stop reason: HOSPADM

## 2022-11-20 RX ORDER — DEXAMETHASONE SODIUM PHOSPHATE 4 MG/ML
INJECTION, SOLUTION INTRA-ARTICULAR; INTRALESIONAL; INTRAMUSCULAR; INTRAVENOUS; SOFT TISSUE AS NEEDED
Status: DISCONTINUED | OUTPATIENT
Start: 2022-11-20 | End: 2022-11-20 | Stop reason: HOSPADM

## 2022-11-20 RX ORDER — MIDAZOLAM HYDROCHLORIDE 1 MG/ML
INJECTION, SOLUTION INTRAMUSCULAR; INTRAVENOUS AS NEEDED
Status: DISCONTINUED | OUTPATIENT
Start: 2022-11-20 | End: 2022-11-20 | Stop reason: HOSPADM

## 2022-11-20 RX ADMIN — SUCCINYLCHOLINE CHLORIDE 100 MG: 20 INJECTION, SOLUTION INTRAMUSCULAR; INTRAVENOUS at 11:42

## 2022-11-20 RX ADMIN — MIDAZOLAM HYDROCHLORIDE 2 MG: 1 INJECTION, SOLUTION INTRAMUSCULAR; INTRAVENOUS at 11:34

## 2022-11-20 RX ADMIN — ONDANSETRON HYDROCHLORIDE 4 MG: 2 SOLUTION INTRAMUSCULAR; INTRAVENOUS at 15:27

## 2022-11-20 RX ADMIN — LIDOCAINE HYDROCHLORIDE 60 MG: 20 INJECTION, SOLUTION EPIDURAL; INFILTRATION; INTRACAUDAL; PERINEURAL at 11:42

## 2022-11-20 RX ADMIN — HYDROMORPHONE HYDROCHLORIDE 0.5 MG: 1 INJECTION, SOLUTION INTRAMUSCULAR; INTRAVENOUS; SUBCUTANEOUS at 16:21

## 2022-11-20 RX ADMIN — HYDROMORPHONE HYDROCHLORIDE 1 MG: 1 INJECTION, SOLUTION INTRAMUSCULAR; INTRAVENOUS; SUBCUTANEOUS at 04:06

## 2022-11-20 RX ADMIN — OXYCODONE AND ACETAMINOPHEN 1 TABLET: 5; 325 TABLET ORAL at 22:23

## 2022-11-20 RX ADMIN — GLYCOPYRROLATE 0.4 MG: 0.2 INJECTION INTRAMUSCULAR; INTRAVENOUS at 15:36

## 2022-11-20 RX ADMIN — CEFAZOLIN SODIUM 2 G: 1 POWDER, FOR SOLUTION INTRAMUSCULAR; INTRAVENOUS at 12:00

## 2022-11-20 RX ADMIN — OXYCODONE AND ACETAMINOPHEN 1 TABLET: 5; 325 TABLET ORAL at 08:12

## 2022-11-20 RX ADMIN — FENTANYL CITRATE 50 MCG: 50 INJECTION, SOLUTION INTRAMUSCULAR; INTRAVENOUS at 11:34

## 2022-11-20 RX ADMIN — OXYCODONE AND ACETAMINOPHEN 1 TABLET: 5; 325 TABLET ORAL at 18:27

## 2022-11-20 RX ADMIN — SODIUM CHLORIDE, POTASSIUM CHLORIDE, SODIUM LACTATE AND CALCIUM CHLORIDE: 600; 310; 30; 20 INJECTION, SOLUTION INTRAVENOUS at 11:34

## 2022-11-20 RX ADMIN — FENTANYL CITRATE 50 MCG: 50 INJECTION, SOLUTION INTRAMUSCULAR; INTRAVENOUS at 15:34

## 2022-11-20 RX ADMIN — CEFAZOLIN 2 G: 1 INJECTION, POWDER, FOR SOLUTION INTRAMUSCULAR; INTRAVENOUS at 21:44

## 2022-11-20 RX ADMIN — ATORVASTATIN CALCIUM 10 MG: 10 TABLET, FILM COATED ORAL at 21:43

## 2022-11-20 RX ADMIN — HYDROMORPHONE HYDROCHLORIDE 0.5 MG: 2 INJECTION, SOLUTION INTRAMUSCULAR; INTRAVENOUS; SUBCUTANEOUS at 16:00

## 2022-11-20 RX ADMIN — FENTANYL CITRATE 50 MCG: 50 INJECTION, SOLUTION INTRAMUSCULAR; INTRAVENOUS at 15:45

## 2022-11-20 RX ADMIN — ROCURONIUM BROMIDE 20 MG: 10 INJECTION INTRAVENOUS at 13:32

## 2022-11-20 RX ADMIN — FENTANYL CITRATE 50 MCG: 50 INJECTION, SOLUTION INTRAMUSCULAR; INTRAVENOUS at 13:27

## 2022-11-20 RX ADMIN — PROPOFOL 100 MG: 10 INJECTION, EMULSION INTRAVENOUS at 11:42

## 2022-11-20 RX ADMIN — DEXAMETHASONE SODIUM PHOSPHATE 4 MG: 4 INJECTION, SOLUTION INTRAMUSCULAR; INTRAVENOUS at 12:05

## 2022-11-20 RX ADMIN — PROPOFOL 100 MG: 10 INJECTION, EMULSION INTRAVENOUS at 11:44

## 2022-11-20 RX ADMIN — ROCURONIUM BROMIDE 30 MG: 10 INJECTION INTRAVENOUS at 11:54

## 2022-11-20 RX ADMIN — VALSARTAN 160 MG: 80 TABLET, FILM COATED ORAL at 08:13

## 2022-11-20 RX ADMIN — OXYCODONE AND ACETAMINOPHEN 1 TABLET: 5; 325 TABLET ORAL at 00:37

## 2022-11-20 RX ADMIN — NEOSTIGMINE METHYLSULFATE 3 MG: 1 INJECTION, SOLUTION INTRAVENOUS at 15:36

## 2022-11-20 RX ADMIN — FENTANYL CITRATE 50 MCG: 50 INJECTION, SOLUTION INTRAMUSCULAR; INTRAVENOUS at 11:42

## 2022-11-20 RX ADMIN — SODIUM CHLORIDE 75 ML/HR: 9 INJECTION, SOLUTION INTRAVENOUS at 18:28

## 2022-11-20 NOTE — PROGRESS NOTES
Hospitalist Progress Note                               Kym Das MD                                     Answering service: [phone]                               OR 36 from in house phone                                         Date of Service:  2022  NAME:  Tracy Kiser  :  1945  MRN:  [id number]      Admission Summary:   The patient is a 59-year-old female who has previous history significant for hypertension and hyperlipidemia, who came to the emergency room due to left knee pain. The patient stated that she was playing  with her dog when she got run into other [place] retriever and was knocked to the ground. The patient presented to ER due to left knee pain. According to the EMS, the patient's left knee had a valgus deformity to the left knee. The patient was given a dose of fentanyl. In the emergency room, an x-ray was done which showed fracture of proximal tibia and fibula. Reason for follow up:     Fracture. She denies any complaints. No chest pain or SOB     Assessment & Plan:     Depressed left tibial plateau and fibular head fracture scheduled for surgery today. Pain control adequate. Hypertension : Bp ok. Recently seen by Cardiologist for annual visit per patient no issues per Cardiologist. On Valsartan. She walks 2-3 miles daily no chest pain or SOB. Hyperlipidemia: On Low dose Statin            Code status: Full  DVT prophylaxis: Lovenox after Surgery on SCD at this time  Care Plan discussed with: Patient  Patient has given Verbal permission to discuss medical care with   persons present in the room and and also with contact as listed on face sheet.    Discharge planning/disposition: TBD    Hospital Problems  Never Reviewed            Codes Class Noted POA    Tibia fracture ICD-10-CM: S82.209A  ICD-9-CM: 823.80  2022 Unknown             Review of Systems:   A comprehensive review of systems was negative except for that written in the HPI. Physical Examination:      Last 24hrs VS reviewed since prior progress note. Most recent are:  Visit Vitals  BP (!) 145/76 (BP 1 Location: Left upper arm, BP Patient Position: At rest)   Pulse 69   Temp 98.4 °F (36.9 °C)   Resp 16   Ht 5' 5\" (1.651 m)   Wt 68 kg (150 lb)   SpO2 98%   BMI 24.96 kg/m²           Constitutional:  No acute distress, cooperative, pleasant    HEENT: Head is a traumatic,  Un icteric sclera. Pink conjunctiva,no erythema or discharge. Oral mucous moist, oropharynx benign. Neck supple,    Resp:  CTA bilaterally. No wheezing/rhonchi/rales. No accessory muscle use   CV:  Regular rhythm, normal rate, no murmurs, gallops, rubs    GI:  Soft, non distended, non tender. normoactive bowel sounds, no hepatosplenomegaly    :  No CVA or suprapubic tenderness   Skin  :  No erythema,rash,bullae,dipigmentation     Musculoskeletal:  No edema, warm, 2+ pulses throughout    Neurologic:  AAOx3, CN II-XII reviewed. Moves all extremities. Psych:  Good insight, Not anxious nor agitated. Intake/Output Summary (Last 24 hours) at 11/20/2022 0929  Last data filed at 11/20/2022 8599  Gross per 24 hour   Intake 0 ml   Output --   Net 0 ml          Data Review:    Review and/or order of clinical lab test      Labs:     Recent Labs     11/20/22  0430 11/19/22  1715   WBC 6.1 8.4   HGB 11.6 12.6   HCT 35.7 39.0    216     Recent Labs     11/20/22  0430 11/19/22  1715    138   K 3.9 3.8    104   CO2 28 29   BUN 10 13   CREA 0.56 0.75   * 119*   CA 9.3 9.6     Recent Labs     11/19/22  1715   ALT 23   AP 72   TBILI 0.9   TP 7.5   ALB 3.8   GLOB 3.7     Recent Labs     11/19/22  1715   INR 1.0   PTP 10.2      No results for input(s): FE, TIBC, PSAT, FERR in the last 72 hours. No results found for: FOL, RBCF   No results for input(s): PH, PCO2, PO2 in the last 72 hours. No results for input(s): CPK, CKNDX, TROIQ in the last 72 hours.     No lab exists for component: CPKMB  No results found for: CHOL, CHOLX, CHLST, CHOLV, HDL, HDLP, LDL, LDLC, DLDLP, TGLX, TRIGL, TRIGP, CHHD, CHHDX  No results found for: GLUCPOC  No results found for: COLOR, APPRN, SPGRU, REFSG, GILLES, PROTU, GLUCU, KETU, BILU, UROU, SAMEER, LEUKU, GLUKE, EPSU, BACTU, WBCU, RBCU, CASTS, UCRY      Medications Reviewed:     Current Facility-Administered Medications   Medication Dose Route Frequency    ceFAZolin (ANCEF) 2 g in sterile water (preservative free) 20 mL IV syringe  2 g IntraVENous ON CALL TO OR    atorvastatin (LIPITOR) tablet 10 mg  10 mg Oral QHS    valsartan (DIOVAN) tablet 160 mg  160 mg Oral DAILY    ondansetron (ZOFRAN) injection 4 mg  4 mg IntraVENous Q6H PRN    oxyCODONE-acetaminophen (PERCOCET) 5-325 mg per tablet 1 Tablet  1 Tablet Oral Q4H PRN    Or    oxyCODONE-acetaminophen (PERCOCET) 5-325 mg per tablet 1 Tablet  1 Tablet Oral Q4H PRN    HYDROmorphone (DILAUDID) injection 1 mg  1 mg IntraVENous Q4H PRN     ______________________________________________________________________  EXPECTED LENGTH OF STAY: - - -  ACTUAL LENGTH OF STAY:          1                 Stacy Portillo MD

## 2022-11-20 NOTE — ANESTHESIA PREPROCEDURE EVALUATION
Relevant Problems   No relevant active problems       Anesthetic History   No history of anesthetic complications            Review of Systems / Medical History  Patient summary reviewed and pertinent labs reviewed    Pulmonary  Within defined limits                 Neuro/Psych   Within defined limits           Cardiovascular    Hypertension          Hyperlipidemia         GI/Hepatic/Renal  Within defined limits              Endo/Other  Within defined limits           Other Findings              Physical Exam    Airway  Mallampati: II  TM Distance: 4 - 6 cm  Neck ROM: normal range of motion   Mouth opening: Normal     Cardiovascular  Regular rate and rhythm,  S1 and S2 normal,  no murmur, click, rub, or gallop  Rhythm: regular  Rate: normal         Dental  No notable dental hx       Pulmonary  Breath sounds clear to auscultation               Abdominal  GI exam deferred       Other Findings            Anesthetic Plan    ASA: 2  Anesthesia type: general          Induction: Intravenous  Anesthetic plan and risks discussed with: Patient

## 2022-11-20 NOTE — PERIOP NOTES
TRANSFER - OUT REPORT:    Verbal report given to lynda alberts(name) on Linda Hunt  being transferred to [facility](unit) for routine post - op       Report consisted of patients Situation, Background, Assessment and   Recommendations(SBAR). Information from the following report(s) Procedure Summary, Intake/Output, and MAR was reviewed with the receiving nurse. Lines:   Peripheral IV 11/20/22 Anterior;Left;Proximal Forearm (Active)   Site Assessment Clean, dry, & intact 11/20/22 1605   Phlebitis Assessment 0 11/20/22 1605   Infiltration Assessment 0 11/20/22 1605   Dressing Status Clean, dry, & intact 11/20/22 1605   Dressing Type Tape;Transparent 11/20/22 1605   Hub Color/Line Status Pink 11/20/22 1605        Opportunity for questions and clarification was provided.       Patient transported with:   Registered Nurse

## 2022-11-20 NOTE — PROGRESS NOTES
Bedside shift change report given to Ping Bolden (oncoming nurse) by [facility] (offgoing nurse). Report included the following information SBAR, Kardex, Procedure Summary, Intake/Output, MAR, and Recent Results.

## 2022-11-20 NOTE — PROGRESS NOTES
Problem: Patient Education: Go to Patient Education Activity  Goal: Patient/Family Education  Outcome: Progressing Towards Goal     Problem: Pain  Goal: *Control of Pain  Outcome: Progressing Towards Goal     Problem: Patient Education: Go to Patient Education Activity  Goal: Patient/Family Education  Outcome: Progressing Towards Goal     Problem: Pressure Injury - Risk of  Goal: *Prevention of pressure injury  Description: Document Taz Scale and appropriate interventions in the flowsheet. Outcome: Progressing Towards Goal  Note: Pressure Injury Interventions:             Activity Interventions: PT/OT evaluation    Mobility Interventions: PT/OT evaluation    Nutrition Interventions: Document food/fluid/supplement intake

## 2022-11-20 NOTE — ANESTHESIA POSTPROCEDURE EVALUATION
Procedure(s):  TIBIAL PLATEAU OPEN REDUCTION INTERNAL FIXATION. general    Anesthesia Post Evaluation      Multimodal analgesia: multimodal analgesia not used between 6 hours prior to anesthesia start to PACU discharge  Patient location during evaluation: PACU  Patient participation: complete - patient participated  Level of consciousness: awake  Pain management: adequate  Airway patency: patent  Anesthetic complications: no  Cardiovascular status: acceptable, blood pressure returned to baseline and hemodynamically stable  Respiratory status: acceptable  Hydration status: acceptable  Post anesthesia nausea and vomiting:  controlled      INITIAL Post-op Vital signs:   Vitals Value Taken Time   /57 11/20/22 1640   Temp 36.9 °C (98.5 °F) 11/20/22 1620   Pulse 63 11/20/22 1641   Resp 10 11/20/22 1641   SpO2 95 % 11/20/22 1641   Vitals shown include unvalidated device data.

## 2022-11-20 NOTE — PROGRESS NOTES
Problem: Falls - Risk of  Goal: *Absence of Falls  Description: Document Shea Skill Fall Risk and appropriate interventions in the flowsheet.   Outcome: Progressing Towards Goal  Note: Fall Risk Interventions:            Medication Interventions: Bed/chair exit alarm    Elimination Interventions: Bed/chair exit alarm, Call light in reach              Problem: Patient Education: Go to Patient Education Activity  Goal: Patient/Family Education  Outcome: Progressing Towards Goal     Problem: Pain  Goal: *Control of Pain  Outcome: Progressing Towards Goal     Problem: Patient Education: Go to Patient Education Activity  Goal: Patient/Family Education  Outcome: Progressing Towards Goal

## 2022-11-20 NOTE — PROGRESS NOTES
11/20/2022  3:13 PM    Case management note    Reason for Admission:  GLF / tib fracture    Patient came to hospital after fall playing with greg dog. Patient lives alone, independent with ADL's and drives. Patient has history of HTN and HLD.   [facility]                     RUR Score:          6%           Plan for utilizing home health:      PT/OT to eval    PCP: First and Last name:  Gumaro Madrid MD     Name of Practice:    Are you a current patient: Yes/No: yes   Approximate date of last visit:    Can you participate in a virtual visit with your PCP:                     Current Advanced Directive/Advance Care Plan: No Order NO AD      Healthcare Decision Maker:   Sil Garza spouse  or                              Transition of Care Plan:                      Home with family assistance  PCP follow up  AD planning  Ortho follow up  CM to follow for discharge needs    Care Management Interventions  Support Systems: Spouse/Significant Other  Discharge Location  Patient Expects to be Discharged to[de-identified] Home with home health (with PT/OT if needed)  Onetha Lax

## 2022-11-21 LAB
ATRIAL RATE: 66 BPM
CALCULATED P AXIS, ECG09: 41 DEGREES
CALCULATED R AXIS, ECG10: -23 DEGREES
CALCULATED T AXIS, ECG11: 37 DEGREES
DIAGNOSIS, 93000: NORMAL
HCT VFR BLD AUTO: 31.7 % (ref 35–47)
HGB BLD-MCNC: 10.2 G/DL (ref 11.5–16)
P-R INTERVAL, ECG05: 202 MS
Q-T INTERVAL, ECG07: 386 MS
QRS DURATION, ECG06: 94 MS
QTC CALCULATION (BEZET), ECG08: 404 MS
VENTRICULAR RATE, ECG03: 66 BPM

## 2022-11-21 PROCEDURE — 65270000029 HC RM PRIVATE

## 2022-11-21 PROCEDURE — 74011250636 HC RX REV CODE- 250/636: Performed by: NURSE PRACTITIONER

## 2022-11-21 PROCEDURE — 74011250637 HC RX REV CODE- 250/637: Performed by: PHYSICIAN ASSISTANT

## 2022-11-21 PROCEDURE — 77030038269 HC DRN EXT URIN PURWCK BARD -A

## 2022-11-21 PROCEDURE — 74011250637 HC RX REV CODE- 250/637: Performed by: HOSPITALIST

## 2022-11-21 PROCEDURE — 74011000250 HC RX REV CODE- 250: Performed by: PHYSICIAN ASSISTANT

## 2022-11-21 PROCEDURE — 77010033678 HC OXYGEN DAILY

## 2022-11-21 PROCEDURE — 85018 HEMOGLOBIN: CPT

## 2022-11-21 PROCEDURE — 74011250636 HC RX REV CODE- 250/636: Performed by: PHYSICIAN ASSISTANT

## 2022-11-21 PROCEDURE — 36415 COLL VENOUS BLD VENIPUNCTURE: CPT

## 2022-11-21 RX ADMIN — SODIUM CHLORIDE 125 ML/HR: 9 INJECTION, SOLUTION INTRAVENOUS at 16:16

## 2022-11-21 RX ADMIN — ENOXAPARIN SODIUM 40 MG: 100 INJECTION SUBCUTANEOUS at 08:34

## 2022-11-21 RX ADMIN — OXYCODONE AND ACETAMINOPHEN 1 TABLET: 5; 325 TABLET ORAL at 03:44

## 2022-11-21 RX ADMIN — OXYCODONE AND ACETAMINOPHEN 1 TABLET: 5; 325 TABLET ORAL at 20:26

## 2022-11-21 RX ADMIN — CEFAZOLIN 2 G: 1 INJECTION, POWDER, FOR SOLUTION INTRAMUSCULAR; INTRAVENOUS at 03:44

## 2022-11-21 RX ADMIN — OXYCODONE AND ACETAMINOPHEN 1 TABLET: 5; 325 TABLET ORAL at 07:32

## 2022-11-21 RX ADMIN — OXYCODONE AND ACETAMINOPHEN 1 TABLET: 5; 325 TABLET ORAL at 12:04

## 2022-11-21 RX ADMIN — OXYCODONE AND ACETAMINOPHEN 1 TABLET: 5; 325 TABLET ORAL at 16:15

## 2022-11-21 RX ADMIN — SODIUM CHLORIDE 75 ML/HR: 9 INJECTION, SOLUTION INTRAVENOUS at 08:45

## 2022-11-21 RX ADMIN — ATORVASTATIN CALCIUM 10 MG: 10 TABLET, FILM COATED ORAL at 22:00

## 2022-11-21 NOTE — PROGRESS NOTES
Stefan Olguin [facility] [address]  [address] [facility], [address] [facility]  [phone] [facility] Adult  Hospitalist Group                                                                                          Hospitalist Progress Note  El Rosenbaum MD        Date of Service:  2022  NAME:  Viki Phelps  :  1945  MRN:  [id number]      Admission Summary:   73-year-old female is admitted after a fall and found to have a proximal tibia and fibula fracture    Interval history / Subjective:   Reports pain is controlled with Percocet     Assessment & Plan:     Depressed left tibial plateau and fibular head fracture  -S/p tibial plateau open reduction internal fixation on   -PT and OT to evaluate. Nonweightbearing left lower extremity with T scope knee brace unlocked position at all times. Plan for nonweightbearing for 10 to 12 weeks  -Continue pain regimen for now  -Lovenox for 30 days    Hypertension  -Continue valsartan    Hyperlipidemia  -Continue low-dose statin    Code status: Full code  DVT prophylaxis: [facility]       Hospital Problems  Never Reviewed            Codes Class Noted POA    Tibia fracture ICD-10-CM: S82.209A  ICD-9-CM: 823.80  2022 Unknown             Review of Systems:   A comprehensive review of systems was negative except for that written in the HPI. Vital Signs:    Last 24hrs VS reviewed since prior progress note.  Most recent are:  Visit Vitals  BP (!) 97/56 (BP 1 Location: Left upper arm, BP Patient Position: At rest)   Pulse 75   Temp 98.8 °F (37.1 °C)   Resp 16   Ht 5' 5\" (1.651 m)   Wt 68 kg (150 lb)   SpO2 96%   BMI 24.96 kg/m²         Intake/Output Summary (Last 24 hours) at 2022 1453  Last data filed at 2022 0736  Gross per 24 hour   Intake 1565 ml   Output 1000 ml   Net 565 ml        Physical Examination:             Constitutional:  No acute distress, cooperative, pleasant    ENT:  Oral mucosa moist, oropharynx benign. Resp:  CTA bilaterally. No wheezing/rhonchi/rales. No accessory muscle use   CV:  Regular rhythm, normal rate, no murmurs, gallops, rubs    GI:  Soft, non distended, non tender. normoactive bowel sounds, no hepatosplenomegaly     Musculoskeletal:  No edema, warm, 2+ pulses throughout    Neurologic:  Moves all extremities. AAOx3, CN II-XII reviewed     Psych:  Good insight, Not anxious nor agitated. Data Review:    Review and/or order of clinical lab test      Labs:     Recent Labs     11/20/22 0430 11/19/22 1715   WBC 6.1 8.4   HGB 11.6 12.6   HCT 35.7 39.0    216     Recent Labs     11/20/22 0430 11/19/22 1715    138   K 3.9 3.8    104   CO2 28 29   BUN 10 13   CREA 0.56 0.75   * 119*   CA 9.3 9.6     Recent Labs     11/19/22 1715   ALT 23   AP 72   TBILI 0.9   TP 7.5   ALB 3.8   GLOB 3.7     Recent Labs     11/19/22 1715   INR 1.0   PTP 10.2      No results for input(s): FE, TIBC, PSAT, FERR in the last 72 hours. No results found for: FOL, RBCF   No results for input(s): PH, PCO2, PO2 in the last 72 hours. No results for input(s): CPK, CKNDX, TROIQ in the last 72 hours.     No lab exists for component: CPKMB  No results found for: CHOL, CHOLX, CHLST, CHOLV, HDL, HDLP, LDL, LDLC, DLDLP, TGLX, TRIGL, TRIGP, CHHD, CHHDX  No results found for: GLUCPOC  No results found for: COLOR, APPRN, SPGRU, REFSG, GILLES, PROTU, GLUCU, KETU, BILU, UROU, SAMEER, LEUKU, GLUKE, EPSU, BACTU, WBCU, RBCU, CASTS, UCRY      Medications Reviewed:     Current Facility-Administered Medications   Medication Dose Route Frequency    0.9% sodium chloride infusion  125 mL/hr IntraVENous CONTINUOUS    enoxaparin (LOVENOX) injection 40 mg  40 mg SubCUTAneous Q24H    oxyCODONE-acetaminophen (PERCOCET) 5-325 mg per tablet 2 Tablet  2 Tablet Oral Q4H PRN    Or    oxyCODONE-acetaminophen (PERCOCET) 5-325 mg per tablet 1 Tablet  1 Tablet Oral Q4H PRN    atorvastatin (LIPITOR) tablet 10 mg  10 mg Oral QHS valsartan (DIOVAN) tablet 160 mg  160 mg Oral DAILY    ondansetron (ZOFRAN) injection 4 mg  4 mg IntraVENous Q6H PRN    HYDROmorphone (DILAUDID) injection 1 mg  1 mg IntraVENous Q4H PRN     ______________________________________________________________________  EXPECTED LENGTH OF STAY: 2d 16h  ACTUAL LENGTH OF STAY:          2                 Timmy Napier MD

## 2022-11-21 NOTE — OP NOTES
Operative Note    Patient: Jamilah Valente  YOB: 1945  MRN: [id number]    Date of Procedure: 11/20/2022     Pre-Op Diagnosis: Tibial Plateau Fracture    Post-Op Diagnosis: Same as preoperative diagnosis. Procedure(s):  TIBIAL PLATEAU OPEN REDUCTION INTERNAL FIXATION    Surgeon(s):  Mikael Marrufo MD    Surgical Assistant: Surg Asst-1: Abby Dalton LPN    Anesthesia: General     Estimated Blood Loss (mL):  less than 800     Complications: None    Specimens: * No specimens in log *     Implants:   Implant Name Type Inv. Item Serial No.  Lot No. LRB No. Used Action   BONE CHIP CANC [address] 1-8MM 30ML --  - [id number]  BONE CHIP CANC [address] 1-8MM 30ML --  [id number] [facility] NA Left 1 Implanted   BONE CHIP CANC [address] 1-8MM 30ML --  - [id number]  BONE CHIP CANC [address] 1-8MM 30ML --  [id number] [facility] NA Left 1 Implanted   SCR BNE LCK AXSOS 4.0X50MM TI -- AXSOS 3 TI - SNA  SCR BNE LCK AXSOS 4.0X50MM TI -- AXSOS 3 TI NA [employer] [facility] NA Left 1 Implanted   SCREW BNE L55MM DIA4MM TI ALLY SHIV FULL THRD T15 DRV AXSOS - SNA  SCREW BNE L55MM DIA4MM TI ALLY SHIV FULL THRD T15 DRV AXSOS NA [employer] [facility] NA Left 2 Implanted   SCR BNE LCK 4.0X60MM TI -- AXSOS 3 TI - SNA  SCR BNE LCK 4.0X60MM TI -- AXSOS 3 TI NA [employer] [facility] NA Left 1 Implanted   SCR BNE YESENIA 3.5X32MM TI -- AXSOS - SNA  SCR BNE YESENIA 3.5X32MM TI -- AXSOS NA [employer] [facility] Left 1 Implanted   SCREW BNE L34MM DIA3. 5MM STD YESENIA TI ST SHIV LO PROF FOR - SNA  SCREW BNE L34MM DIA3. 5MM STD YESENIA TI ST SHIV LO PROF FOR NA [employer] HOW[employer] NA Left 1 Implanted   PLATE BNE [id number] 2 H TIB TI SHIV FOR 4MM SCR AXSOS 3 - SNA  PLATE BNE H53GE 2 H TIB TI SHIV FOR 4MM SCR AXSOS 3 NA [employer] NA Left 1 Implanted   4H 2.7 PLATE NARROW   NA [employer] NA Left 1 Implanted   SCREW SPNL HEX 6X60 MM [facility] SCREW SPNL HEX 6X60 MM [facility] NA [employer] NA Left 1 Implanted   2.4 LOCKING SCREW 65   NA [employer] NA Left 3 Implanted   SCREW BNE L50MM DIA4MM CANC TI ST SHIV FULL THRD T15 DRV LO - SNA  SCREW BNE L50MM DIA4MM CANC TI ST SHIV FULL THRD T15 DRV LO NA [employer] HOW[employer] NA Left 1 Implanted       Drains:   [REMOVED] Orogastric Tube 11/20/22 (Removed)       Findings: Displaced lateral tibia plateau fracture    Indication for Procedure:    Mrs. Jolene Solo is a 78 yo F who presented to [facility] with a displaced right tibia plateau fracture s/p fall. Per the patient, she was playing fetch with her dog when she got ran into by another dog, knocking her to the ground. Denies previous hx of trauma or falls. They were seen at an urgent care/ER where xrays were taken that confirmed the injury. After a long discussion with the patient about goals of care and treatment options. We decided to proceed with operative intervention    The risks, benefits, and alternative treatments options were explained and reviewed in detail with the patient. Risk included but not limited to infection, bleeding, injury to neurovascular structures, potential non-union, mal-union, pain, hardware failure, death, and risk of anesthesia itself. The patient verbalized understanding the risks and benefits and voluntarily provided informed written consent. Detailed Description of Procedure: The patient was seen in the preoperative holding area and identified per the hospital/[facility] identification protocol. The site and side were confirmed and the patient was marked by Dr. Ron Calhoun. Once again, the procedure risks and benefits were explained and any questions the patient had were answered. The written consent for the operation, anesthesia, and blood products were confirmed by the attending, OR nurse, and anesthesia attending.  The patient was subsequently brought back to the operative theater and placed in supine position on a regular table. An initial timeout was performed confirming the patients name, date of birth, procedures to be performed as well as laterality. All present agreed they could visualize the attending surgeon initials on the operative extremity. All medical personnel introduced themselves and role in the case. The patient then underwent  general endotracheal anesthesia. All bony prominences were well padded. The patient operative extremity was then prepped and draped in the usual sterile fashion. A final time was performed confirming the patients name, date of birth, procedure to be performed, anticipated blood loss, equipment/implant check, operative extremity, and medical personal introductions. All present were in agreement. The patient was given preoperative antibiotics 2g IV ancef. A 10 cm curvilinear incision was made over the lateral tibia. The incision was centered over Gerdy's tubercle. Once through the skin and subcutaneous tissue, the fascia over the extensor muscles was incised 1.5 cm lateral to the edge of the tibia spine in line with the skin incision. Blunt dissection was used to takedown the extensor muscle belly off of the lateral tibia exposing the fracture site. Then a 15 blade scalpel used to make a submeniscal arthrotomy and multiple 0 Vicryl suture were used to tag the edge of the meniscus. The meniscus was examined and any tear is found the meniscus was repaired with 3-0 Prolene (Inside out technique). Then the lateral wall of the tibial plateau fracture was booked open exposing the articular fragment. The fragments were mobilized using an osteotome, periosteal elevator, and a Mancilla. Once the articular surface was elevated back to its original position, several 2.0 K-wires to hold the fragment in position. Fluoro was used to confirm proper elevation and height of the fragments. Then the metaphysis of the tibia was backfilled with cancellous bone chips.  A large reduction clamp was used to compress the joint surface of the proximal tibia. Then a precontoured lateral tibia plate was placed. Locking and cortical/cancellous screws of varying sizes were placed. Then to provided additional support to the articular surface, a 4 hole strap place was placed with 2.4 locking screws. The wound was then irrigated with normal saline. The incision was closed in layered fashion. 0 Vicryl for the fascia, 2-0 vicryl for the subcutaneous tissue, and 2-0 running prolene for the skin. The skin was then cleaned and a sterile dressing was placed. The instruments, sponge, and needle counts were all correct at the end of the case. I was presented and made critical decisions in the pre operative, intra-op, and post operative care of this patient in question. Post Op Plan:  Pain Control as directed (OTC and Prescription medication sent to pharmacy)  WB Status: NWB in hinge knee brace locked in extension  Change dressing after 7 days.    Always have a dressings with an acewrap over the incision when wearing the brace  Will be NWB for 10-12 weeks due to the intra-articular involvement  DVT ppx per primary  PT OT eval  Will be seen back in clinic in 2 weeks    Electronically Signed by Carlton Mcguire MD on 11/21/2022 at 12:16 PM

## 2022-11-21 NOTE — PROGRESS NOTES
Patient oozing moderate amount of drainage from posterior LLE through ACE wrap. Primary RN and PCT assisted patient with wipe bath, changed linens and gown, and changed venus pad under patient. Additional venus pad placed under LLE. Primary RN called Belén Gutierrez NP. NP sts she will round again on patient to assess drainage.

## 2022-11-21 NOTE — PROGRESS NOTES
Orhto Progress Note:    Pt resting in bed with pain well managed this evening   + serosanguinous drainage noted to ace wrap   Hgb stable at 10.2  We will monitor drainage, reinforce dressing as needed with plan to change at discharge with ace over dressing to protect skin   Continue lovenox while inpt and at rehab, if pt goes home with [place] plan for ASA     Pt seen with Dr. Dylan Johnson.  Suhail, BETSY-BC  Orthopedic Trauma Team [id number] [place]

## 2022-11-21 NOTE — PROGRESS NOTES
11/21/2022  2:06 PM  Care Management Progress Note      ICD-10-CM ICD-9-CM    1. Tibial plateau fracture, left, closed, initial encounter  S82.142A 823.00           RUR:  8%  Risk Level: [x]Low []Moderate []High  Value-based purchasing: [] Yes [x] No  Bundle patient: [] Yes [x] No   Specify:     Transition of care plan:  Awaiting medical clearance and DC order. OR today for ORIF.   TBD HH vs Rehab - pending PT/OT evals and reqs. Outpatient follow-up. Transport need TBD.

## 2022-11-21 NOTE — DISCHARGE INSTRUCTIONS
Please take the time to review the following instructions before you leave the hospital and use them as guidelines during your recovery from surgery. If you have any questions you may contact the office at [phone]. SPECIAL INSTRUCTIONS :   1. You may walk  with a walker  2. Weight bearing status is Non-weight bearing, brace locked in full extension, only off for skin care at rest.  3. A walker should be used for mobilization at all times until cleared by Dr. Amado Cason. Wound Care/ Dressing Changes:    DRESSING :   You may change your dressings after 7 days and then as needed following discharge from the hospital.  It isnt necessary to apply antibiotic ointment to your incisions. If you have steri-strips over your incision they will start to peel off in 7-10 days as you get them wet. They dont need to be removed before that. When they begin to peel off, you may remove them. Dermabond may be used to close the incision which appears as a thick covering over the incision. Do not pick or pull at this covering which should fall off naturally. Showering/ Bathing: If your incision is dry without drainage you may shower covering the leg/brace. Do not soak your incision under water. If there is continued drainage or you are concerned contact Dr. Marley Page office prior to showering. Diet:  You may advance to your regular diet as tolerated. Increase your clear liquid intake for the next 2-3 days. Medication:      You will be given prescriptions for pain medication when you are discharged from the hospital. The side effects of these medications can be substantial and the narcotic medications are not mandatory. You may substitute these medications with Tylenol and Alleve or Motrin. Please use the medications as prescribed. Pain medications may cause constipation- Colace or Milk of Magnesia may be used as needed.   Other possible side effects of pain medication are dizziness, headache, nausea, vomiting, and urinary retention. Discontinue the pain medication if you develop itching, rash, shortness of breath, or difficulties swallowing. If these symptoms become severe or are not relieved by discontinuing the medication, you should seek immediate medical attention. Refills of pain medication are authorized during office hours only (8 AM- 5 PM  Monday thru Friday). Many of these medication will require you or a family member to pick-up a physical prescription at the office. Medications other than antiinflammatories will not be called into the pharmacy after business hours. Do not take Tylenol/Acetaminophen in addition to your pain medication as most pain medications already contain this ingredient. Do not exceed 4000mg of Tylenol/Acetaminophen per day. You may resume the medication(s) you were taking prior to your surgery. Narcotics may change the effects of some antidepressant medication(s). If you have any questions about possible interactions between your regular medications and the pain medication, you should ask the pharmacist or contact the prescribing physician. You will be discharged with prescriptions for additional pain medications (Tramadol or Toradol) and a medication for nausea and vomiting (Phenergan). You only need to fill these prescriptions if the primary pain medication is not working or you experiencing post-op nausea. If you have constipation which is not improved by oral stool softeners then a Ducolax suppository should be purchased over the counter. Follow up appointment:    Please follow up at your scheduled appointment 2 weeks from your surgery. If you do not already have an appointment please call our office at [phone] for your follow up appointment. Physical Therapy / Nursing:    Physical Therapy following surgery will be arranged at home. They have specific instructions for rehab and wound care.  .     Returning to work:    Normally we will keep you out of work until you return for your first follow up appointment from surgery. If you feel comfortable returning to work sooner, please call our office. You can discuss with Dr. John Baxter if additional time off  is needed at your first follow up appointment after surgery. Important Signs and Symptoms:    If any of the following signs or symptoms occur, you should contact the office. Please be advised if a problem arises which you feel requires immediate medical attention or you are unable to contact Dr. Gerard Hardin office you should seek immediate medical attention at the ER or other health care facility you have access to.

## 2022-11-21 NOTE — PROGRESS NOTES
Problem: Falls - Risk of  Goal: *Absence of Falls  Description: Document Emery Nagy Fall Risk and appropriate interventions in the flowsheet. Outcome: Progressing Towards Goal  Note: Fall Risk Interventions:            Medication Interventions: Patient to call before getting OOB, Bed/chair exit alarm, Teach patient to arise slowly    Elimination Interventions: Bed/chair exit alarm, Call light in reach, Patient to call for help with toileting needs    History of Falls Interventions: Bed/chair exit alarm, Investigate reason for fall, Room close to nurse's station, Utilize gait belt for transfer/ambulation         Problem: Patient Education: Go to Patient Education Activity  Goal: Patient/Family Education  Outcome: Progressing Towards Goal     Problem: Pain  Goal: *Control of Pain  Outcome: Progressing Towards Goal     Problem: Patient Education: Go to Patient Education Activity  Goal: Patient/Family Education  Outcome: Progressing Towards Goal     Problem: Pressure Injury - Risk of  Goal: *Prevention of pressure injury  Description: Document Taz Scale and appropriate interventions in the flowsheet.   Outcome: Progressing Towards Goal  Note: Pressure Injury Interventions:  Sensory Interventions: Assess changes in LOC, Keep linens dry and wrinkle-free, Maintain/enhance activity level, Minimize linen layers         Activity Interventions: Assess need for specialty bed, Increase time out of bed, PT/OT evaluation    Mobility Interventions: Assess need for specialty bed, HOB 30 degrees or less, PT/OT evaluation    Nutrition Interventions: Document food/fluid/supplement intake, Offer support with meals,snacks and hydration                     Problem: Patient Education: Go to Patient Education Activity  Goal: Patient/Family Education  Outcome: Progressing Towards Goal     Problem: Patient Education: Go to Patient Education Activity  Goal: Patient/Family Education  Outcome: Progressing Towards Goal     Problem: Lower Extremity Fracture:Day of Admission  Goal: Off Pathway (Use only if patient is Off Pathway)  Outcome: Progressing Towards Goal  Goal: Activity/Safety  Outcome: Progressing Towards Goal  Goal: Consults, if ordered  Outcome: Progressing Towards Goal  Goal: Diagnostic Test/Procedures  Outcome: Progressing Towards Goal  Goal: Nutrition/Diet  Outcome: Progressing Towards Goal  Goal: Medications  Outcome: Progressing Towards Goal  Goal: Respiratory  Outcome: Progressing Towards Goal  Goal: Treatments/Interventions/Procedures  Outcome: Progressing Towards Goal  Goal: Psychosocial  Outcome: Progressing Towards Goal  Goal: *Optimal pain control at patient's stated goal  Outcome: Progressing Towards Goal  Goal: *Hemodynamically stable  Outcome: Progressing Towards Goal  Goal: *Adequate oxygenation  Outcome: Progressing Towards Goal     Problem: Lower Extremity Fracture:Day of Surgery (Intiate SCIP Measures for Post-op Care)  Goal: Off Pathway (Use only if patient is Off Pathway)  Outcome: Progressing Towards Goal  Goal: Activity/Safety  Outcome: Progressing Towards Goal  Goal: Consults, if ordered  Outcome: Progressing Towards Goal  Goal: Diagnostic Test/Procedures  Outcome: Progressing Towards Goal  Goal: Nutrition/Diet  Outcome: Progressing Towards Goal  Goal: Medications  Outcome: Progressing Towards Goal  Goal: Respiratory  Outcome: Progressing Towards Goal  Goal: Treatments/Interventions/Procedures  Outcome: Progressing Towards Goal  Goal: Psychosocial  Outcome: Progressing Towards Goal  Goal: *Optimal pain control at patient's stated goal  Outcome: Progressing Towards Goal  Goal: *Hemodynamically stable  Outcome: Progressing Towards Goal  Goal: *Adequate oxygenation  Outcome: Progressing Towards Goal     Problem: Lower Extremity Fracture:Post-op Day 1  Goal: Off Pathway (Use only if patient is Off Pathway)  Outcome: Progressing Towards Goal  Goal: Activity/Safety  Outcome: Progressing Towards Goal  Goal: Consults, if ordered  Outcome: Progressing Towards Goal  Goal: Diagnostic Test/Procedures  Outcome: Progressing Towards Goal  Goal: Nutrition/Diet  Outcome: Progressing Towards Goal  Goal: Discharge Planning  Outcome: Progressing Towards Goal  Goal: Medications  Outcome: Progressing Towards Goal  Goal: Respiratory  Outcome: Progressing Towards Goal  Goal: Treatments/Interventions/Procedures  Outcome: Progressing Towards Goal  Goal: Psychosocial  Outcome: Progressing Towards Goal  Goal: *Optimal pain control at patient's stated goal  Outcome: Progressing Towards Goal  Goal: *Hemodynamically stable  Outcome: Progressing Towards Goal  Goal: *Adequate oxygenation  Outcome: Progressing Towards Goal  Goal: *PT/INR within defined limits  Outcome: Progressing Towards Goal  Goal: *Demonstrates progressive activity  Outcome: Progressing Towards Goal     Problem: Lower Extremity Fracture:Post-op Day 2  Goal: Off Pathway (Use only if patient is Off Pathway)  Outcome: Progressing Towards Goal  Goal: Activity/Safety  Outcome: Progressing Towards Goal  Goal: Diagnostic Test/Procedures  Outcome: Progressing Towards Goal  Goal: Nutrition/Diet  Outcome: Progressing Towards Goal  Goal: Discharge Planning  Outcome: Progressing Towards Goal  Goal: Medications  Outcome: Progressing Towards Goal  Goal: Respiratory  Outcome: Progressing Towards Goal  Goal: Treatments/Interventions/Procedures  Outcome: Progressing Towards Goal  Goal: Psychosocial  Outcome: Progressing Towards Goal  Goal: *Optimal pain control at patient's stated goal  Outcome: Progressing Towards Goal  Goal: *Hemodynamically stable  Outcome: Progressing Towards Goal  Goal: *Adequate oxygenation  Outcome: Progressing Towards Goal  Goal: *PT/INR within defined limits  Outcome: Progressing Towards Goal  Goal: *Demonstrates progressive activity  Outcome: Progressing Towards Goal  Goal: *Voiding  Outcome: Progressing Towards Goal  Goal: *Bowel movement  Outcome: Progressing Towards Goal  Goal: *Tolerating diet  Outcome: Progressing Towards Goal     Problem: Lower Extremity Fracture:Post-op Day 3  Goal: Off Pathway (Use only if patient is Off Pathway)  Outcome: Progressing Towards Goal  Goal: Activity/Safety  Outcome: Progressing Towards Goal  Goal: Diagnostic Test/Procedures  Outcome: Progressing Towards Goal  Goal: Nutrition/Diet  Outcome: Progressing Towards Goal  Goal: Discharge Planning  Outcome: Progressing Towards Goal  Goal: Medications  Outcome: Progressing Towards Goal  Goal: Respiratory  Outcome: Progressing Towards Goal  Goal: Treatments/Interventions/Procedures  Outcome: Progressing Towards Goal  Goal: Psychosocial  Outcome: Progressing Towards Goal  Goal: *Optimal pain control at patient's stated goal  Outcome: Progressing Towards Goal  Goal: *Hemodynamically stable  Outcome: Progressing Towards Goal  Goal: *Adequate oxygenation  Outcome: Progressing Towards Goal  Goal: *PT/INR within defined limits  Outcome: Progressing Towards Goal  Goal: *Demonstrates progressive activity  Outcome: Progressing Towards Goal  Goal: *Voiding  Outcome: Progressing Towards Goal  Goal: *Bowel movement  Outcome: Progressing Towards Goal  Goal: *Tolerating diet  Outcome: Progressing Towards Goal

## 2022-11-21 NOTE — PROGRESS NOTES
Spiritual Care Partner Volunteer visited patient at [address] in [facility] 4M POST SURG ORT 1 on 11/21/2022   Documented by:  Adan Mena M.Div.  [phone])

## 2022-11-21 NOTE — PROGRESS NOTES
Problem: Falls - Risk of  Goal: *Absence of Falls  Description: Document Vero Delgado Fall Risk and appropriate interventions in the flowsheet. Outcome: Progressing Towards Goal  Note: Fall Risk Interventions:            Medication Interventions: Bed/chair exit alarm, Patient to call before getting OOB    Elimination Interventions: Call light in reach, Bed/chair exit alarm    History of Falls Interventions: Bed/chair exit alarm         Problem: Patient Education: Go to Patient Education Activity  Goal: Patient/Family Education  Outcome: Progressing Towards Goal     Problem: Pain  Goal: *Control of Pain  Outcome: Progressing Towards Goal     Problem: Patient Education: Go to Patient Education Activity  Goal: Patient/Family Education  Outcome: Progressing Towards Goal     Problem: Pressure Injury - Risk of  Goal: *Prevention of pressure injury  Description: Document Taz Scale and appropriate interventions in the flowsheet.   Outcome: Progressing Towards Goal  Note: Pressure Injury Interventions:  Sensory Interventions: Assess changes in LOC, Minimize linen layers         Activity Interventions: PT/OT evaluation    Mobility Interventions: HOB 30 degrees or less, Pressure redistribution bed/mattress (bed type)    Nutrition Interventions: Document food/fluid/supplement intake                     Problem: Patient Education: Go to Patient Education Activity  Goal: Patient/Family Education  Outcome: Progressing Towards Goal     Problem: Patient Education: Go to Patient Education Activity  Goal: Patient/Family Education  Outcome: Progressing Towards Goal     Problem: Lower Extremity Fracture:Day of Admission  Goal: Off Pathway (Use only if patient is Off Pathway)  Outcome: Progressing Towards Goal  Goal: Activity/Safety  Outcome: Progressing Towards Goal  Goal: Consults, if ordered  Outcome: Progressing Towards Goal  Goal: Diagnostic Test/Procedures  Outcome: Progressing Towards Goal  Goal: Nutrition/Diet  Outcome: Progressing Towards Goal  Goal: Medications  Outcome: Progressing Towards Goal  Goal: Respiratory  Outcome: Progressing Towards Goal  Goal: Treatments/Interventions/Procedures  Outcome: Progressing Towards Goal  Goal: Psychosocial  Outcome: Progressing Towards Goal  Goal: *Optimal pain control at patient's stated goal  Outcome: Progressing Towards Goal  Goal: *Hemodynamically stable  Outcome: Progressing Towards Goal  Goal: *Adequate oxygenation  Outcome: Progressing Towards Goal

## 2022-11-22 LAB
ANION GAP SERPL CALC-SCNC: 5 MMOL/L (ref 5–15)
BASOPHILS # BLD: 0 K/UL (ref 0–0.1)
BASOPHILS NFR BLD: 0 % (ref 0–1)
BUN SERPL-MCNC: 5 MG/DL (ref 6–20)
BUN/CREAT SERPL: 12 (ref 12–20)
CALCIUM SERPL-MCNC: 7.8 MG/DL (ref 8.5–10.1)
CHLORIDE SERPL-SCNC: 106 MMOL/L (ref 97–108)
CO2 SERPL-SCNC: 26 MMOL/L (ref 21–32)
CREAT SERPL-MCNC: 0.41 MG/DL (ref 0.55–1.02)
DIFFERENTIAL METHOD BLD: ABNORMAL
EOSINOPHIL # BLD: 0 K/UL (ref 0–0.4)
EOSINOPHIL NFR BLD: 1 % (ref 0–7)
ERYTHROCYTE [DISTWIDTH] IN BLOOD BY AUTOMATED COUNT: 12.9 % (ref 11.5–14.5)
GLUCOSE SERPL-MCNC: 86 MG/DL (ref 65–100)
HCT VFR BLD AUTO: 26.4 % (ref 35–47)
HGB BLD-MCNC: 8.6 G/DL (ref 11.5–16)
IMM GRANULOCYTES # BLD AUTO: 0 K/UL (ref 0–0.04)
IMM GRANULOCYTES NFR BLD AUTO: 0 % (ref 0–0.5)
LYMPHOCYTES # BLD: 1.2 K/UL (ref 0.8–3.5)
LYMPHOCYTES NFR BLD: 24 % (ref 12–49)
MCH RBC QN AUTO: 31.2 PG (ref 26–34)
MCHC RBC AUTO-ENTMCNC: 32.6 G/DL (ref 30–36.5)
MCV RBC AUTO: 95.7 FL (ref 80–99)
MONOCYTES # BLD: 0.7 K/UL (ref 0–1)
MONOCYTES NFR BLD: 14 % (ref 5–13)
NEUTS SEG # BLD: 3.1 K/UL (ref 1.8–8)
NEUTS SEG NFR BLD: 61 % (ref 32–75)
NRBC # BLD: 0 K/UL (ref 0–0.01)
NRBC BLD-RTO: 0 PER 100 WBC
PLATELET # BLD AUTO: 196 K/UL (ref 150–400)
PMV BLD AUTO: 9.7 FL (ref 8.9–12.9)
POTASSIUM SERPL-SCNC: 3.8 MMOL/L (ref 3.5–5.1)
RBC # BLD AUTO: 2.76 M/UL (ref 3.8–5.2)
SODIUM SERPL-SCNC: 137 MMOL/L (ref 136–145)
WBC # BLD AUTO: 5 K/UL (ref 3.6–11)

## 2022-11-22 PROCEDURE — 97530 THERAPEUTIC ACTIVITIES: CPT

## 2022-11-22 PROCEDURE — 74011250637 HC RX REV CODE- 250/637: Performed by: PHYSICIAN ASSISTANT

## 2022-11-22 PROCEDURE — 97530 THERAPEUTIC ACTIVITIES: CPT | Performed by: OCCUPATIONAL THERAPIST

## 2022-11-22 PROCEDURE — 97165 OT EVAL LOW COMPLEX 30 MIN: CPT | Performed by: OCCUPATIONAL THERAPIST

## 2022-11-22 PROCEDURE — 97116 GAIT TRAINING THERAPY: CPT

## 2022-11-22 PROCEDURE — 80048 BASIC METABOLIC PNL TOTAL CA: CPT

## 2022-11-22 PROCEDURE — 36416 COLLJ CAPILLARY BLOOD SPEC: CPT

## 2022-11-22 PROCEDURE — 74011250636 HC RX REV CODE- 250/636: Performed by: PHYSICIAN ASSISTANT

## 2022-11-22 PROCEDURE — 74011250637 HC RX REV CODE- 250/637: Performed by: HOSPITALIST

## 2022-11-22 PROCEDURE — 2709999900 HC NON-CHARGEABLE SUPPLY

## 2022-11-22 PROCEDURE — 97162 PT EVAL MOD COMPLEX 30 MIN: CPT

## 2022-11-22 PROCEDURE — 97535 SELF CARE MNGMENT TRAINING: CPT | Performed by: OCCUPATIONAL THERAPIST

## 2022-11-22 PROCEDURE — 65270000029 HC RM PRIVATE

## 2022-11-22 PROCEDURE — 74011250636 HC RX REV CODE- 250/636: Performed by: FAMILY MEDICINE

## 2022-11-22 PROCEDURE — 85025 COMPLETE CBC W/AUTO DIFF WBC: CPT

## 2022-11-22 RX ORDER — SODIUM CHLORIDE 9 MG/ML
125 INJECTION, SOLUTION INTRAVENOUS CONTINUOUS
Status: DISPENSED | OUTPATIENT
Start: 2022-11-22 | End: 2022-11-23

## 2022-11-22 RX ADMIN — SODIUM CHLORIDE 125 ML/HR: 9 INJECTION, SOLUTION INTRAVENOUS at 19:51

## 2022-11-22 RX ADMIN — ATORVASTATIN CALCIUM 10 MG: 10 TABLET, FILM COATED ORAL at 22:55

## 2022-11-22 RX ADMIN — OXYCODONE AND ACETAMINOPHEN 1 TABLET: 5; 325 TABLET ORAL at 15:30

## 2022-11-22 RX ADMIN — OXYCODONE AND ACETAMINOPHEN 1 TABLET: 5; 325 TABLET ORAL at 06:23

## 2022-11-22 RX ADMIN — VALSARTAN 160 MG: 80 TABLET, FILM COATED ORAL at 10:19

## 2022-11-22 RX ADMIN — SODIUM CHLORIDE 125 ML/HR: 9 INJECTION, SOLUTION INTRAVENOUS at 10:22

## 2022-11-22 RX ADMIN — OXYCODONE AND ACETAMINOPHEN 1 TABLET: 5; 325 TABLET ORAL at 19:50

## 2022-11-22 RX ADMIN — SODIUM CHLORIDE 125 ML/HR: 9 INJECTION, SOLUTION INTRAVENOUS at 00:51

## 2022-11-22 RX ADMIN — OXYCODONE AND ACETAMINOPHEN 1 TABLET: 5; 325 TABLET ORAL at 10:19

## 2022-11-22 RX ADMIN — ENOXAPARIN SODIUM 40 MG: 100 INJECTION SUBCUTANEOUS at 10:19

## 2022-11-22 RX ADMIN — OXYCODONE AND ACETAMINOPHEN 1 TABLET: 5; 325 TABLET ORAL at 00:44

## 2022-11-22 NOTE — PROGRESS NOTES
Problem: Self Care Deficits Care Plan (Adult)  Goal: *Acute Goals and Plan of Care (Insert Text)  Description: FUNCTIONAL STATUS PRIOR TO ADMISSION: Patient was independent and active without use of DME.     HOME SUPPORT: The patient lived with her  but did not require assist.    Occupational Therapy Goals  Initiated 11/22/2022  1. Patient will perform lower body dressing with minimal assistance/contact guard assist and best technique/adaptive equipment within 7 day(s). 2.  Patient will perform stand pivot transfer to drop arm bedside commode and maintain NWB left LE with minimal assistance within 7 day(s). 3.  Patient will perform all aspects of toileting with minimal assistance/contact guard assist within 7 day(s). 4.  Patient will perform bilateral UE strengthening exercises independently within 7 day(s). Outcome: Not Met     OCCUPATIONAL THERAPY EVALUATION  Patient: Geovany Blake (87 y.o. female)  Date: 11/22/2022  Primary Diagnosis: Tibia fracture [S82.209A]  Procedure(s) (LRB):  TIBIAL PLATEAU OPEN REDUCTION INTERNAL FIXATION (Left) 2 Days Post-Op   Precautions:   NWB (LLE with brace locked in ext)    ASSESSMENT  Based on the objective data described below, the patient presents with decreased ability to perform LE ADL's, bathing, toileting and all transfers. Limited by NWB left LE, pain, decreased balance and decreased activity tolerance. At this time assist of 2 recommended for stand pivot transfers however feel she will continue to make progress and has supportive family . Current Level of Function Impacting Discharge (ADLs/self-care): max assist LE ADLs and toileting, mod assist of 2 for stand pivot transfers    Functional Outcome Measure: The patient scored 45/100 on the Barthel Index outcome measure which is indicative of partial dependence.       Other factors to consider for discharge: getting equipment from her sister     Patient will benefit from skilled therapy intervention to address the above noted impairments. PLAN :  Recommendations and Planned Interventions: self care training, functional mobility training, therapeutic exercise, balance training, therapeutic activities, endurance activities, patient education, home safety training, and family training/education    Frequency/Duration: Patient will be followed by occupational therapy 5 times a week to address goals. Recommendation for discharge: (in order for the patient to meet his/her long term goals)  Occupational therapy at least 2 days/week in the home AND ensure assist and/or supervision for safety with ADL's and transfers    This discharge recommendation:  Has been made in collaboration with the attending provider and/or case management    IF patient discharges home will need the following DME: getting RW, wheelchair, bedside commode from sister       SUBJECTIVE:   Patient stated I'm overwhelmed.     OBJECTIVE DATA SUMMARY:   HISTORY:   Past Medical History:   Diagnosis Date    Hypertension     Ill-defined condition     elevated cholesterol   History reviewed. No pertinent surgical history. Expanded or extensive additional review of patient history:     Home Situation  Home Environment: Private residence  # Steps to Enter: 2  Rails to Enter: Yes  One/Two Story Residence: Two story, live on 1st floor  # of Interior Steps: 18  Living Alone: No  Support Systems: Spouse/Significant Other  Patient Expects to be Discharged to[de-identified] Home with home health  Current DME Used/Available at Home: Walker, rolling  Tub or Shower Type: Shower    Hand dominance: Right    EXAMINATION OF PERFORMANCE DEFICITS:  Cognitive/Behavioral Status:  Neurologic State: Alert  Orientation Level: Oriented X4  Cognition: Follows commands;Decreased attention/concentration; Appropriate safety awareness; Appropriate for age attention/concentration; Appropriate decision making  Perception: Appears intact  Perseveration: No perseveration noted  Safety/Judgement: Awareness of environment; Fall prevention;Good awareness of safety precautions; Home safety; Insight into deficits    Skin: left LE with ACE wrap in place    Edema: left LE    Hearing: Auditory  Auditory Impairment: None    Vision/Perceptual:                                     Range of Motion:  AROM: Within functional limits (excluding LLE in knee brace locked in ext)                         Strength:  Strength: Generally decreased, functional                Coordination:  Coordination: Within functional limits  Fine Motor Skills-Upper: Left Intact; Right Intact    Gross Motor Skills-Upper: Left Intact; Right Intact    Tone & Sensation:  Tone: Normal  Sensation: Intact                      Balance:  Sitting: Intact  Standing: Impaired  Standing - Static: Fair;Constant support  Standing - Dynamic : Constant support;Poor    Functional Mobility and Transfers for ADLs:  Bed Mobility:  Rolling: Minimum assistance  Supine to Sit: Moderate assistance;Assist x2  Scooting: Moderate assistance;Assist x2    Transfers:  Sit to Stand: Moderate assistance;Assist x2  Stand to Sit: Moderate assistance;Assist x2  Bed to Chair: Assist x2;Adaptive equipment; Additional time; Moderate assistance  Bathroom Mobility: Dependent/total assistance  Toilet Transfer : Moderate assistance;Assist x2;Adaptive equipment    ADL Assessment:  Feeding: Independent    Oral Facial Hygiene/Grooming: Modified Independent         Type of Bath: Chlorhexidine (CHG); Bath Pack; Full    Upper Body Dressing: Contact guard assistance    Lower Body Dressing: Maximum assistance (able to cross right LE)    Toileting: Maximum assistance; Other (comment) (instructed on clothing management)                  ADL Intervention and task modifications:             Educated on role of OT, educated on NWB left LE and techniques to maintain during functional transfers    Educated on compensatory LE dressing techniques and potential benefit of hip kit          Cognitive Retraining  Safety/Judgement: Awareness of environment; Fall prevention;Good awareness of safety precautions; Home safety; Insight into deficits       Functional Measure:    Barthel Index:  Bathin  Bladder: 5  Bowels: 10  Groomin  Dressin  Feeding: 10  Mobility: 0  Stairs: 0  Toilet Use: 5  Transfer (Bed to Chair and Back): 5  Total: 45/100      The Barthel ADL Index: Guidelines  1. The index should be used as a record of what a patient does, not as a record of what a patient could do. 2. The main aim is to establish degree of independence from any help, physical or verbal, however minor and for whatever reason. 3. The need for supervision renders the patient not independent. 4. A patient's performance should be established using the best available evidence. Asking the patient, friends/relatives and nurses are the usual sources, but direct observation and common sense are also important. However direct testing is not needed. 5. Usually the patient's performance over the preceding 24-48 hours is important, but occasionally longer periods will be relevant. 6. Middle categories imply that the patient supplies over 50 per cent of the effort. 7. Use of aids to be independent is allowed. Score Interpretation (from [address] [address])    Independent   60-79 Minimally independent   40-59 Partially dependent   20-39 Very dependent   <20 Totally dependent     -Cristobal Ibarra., Barthel, D.W. (1965). Functional evaluation: the Barthel Index. [facility] ([address] (1997). The Barthel activities of daily living index: self-reporting versus actual performance in the old (> or = 75 years). Journal of [address] [place] [address] 45(7), 14 [facility], AMANDA, Shaka Osborne., Ganga Morris. (1999). Measuring the change in disability after inpatient rehabilitation; comparison of the responsiveness of the Barthel Index and Functional Garrard Measure.  Journal of Neurology, Neurosurgery, and Psychiatry, 664), 248-097. HERRERA Brennan, BRINDA Roca, & Maddi King M.A. (2004) Assessment of post-stroke quality of life in cost-effectiveness studies: The usefulness of the Barthel Index and the EuroQoL-5D. Quality of Life Research, 15, 374-11         Occupational Therapy Evaluation Charge Determination   History Examination Decision-Making   LOW Complexity : Brief history review  LOW Complexity : 1-3 performance deficits relating to physical, cognitive , or psychosocial skils that result in activity limitations and / or participation restrictions  MEDIUM Complexity : Patient may present with comorbidities that affect occupational performnce. Miniml to moderate modification of tasks or assistance (eg, physical or verbal ) with assesment(s) is necessary to enable patient to complete evaluation       Based on the above components, the patient evaluation is determined to be of the following complexity level: LOW   Pain Rating:  Not rated, meds on board    Activity Tolerance:   Fair    After treatment patient left in no apparent distress:    Sitting in chair, Call bell within reach, Caregiver / family present, and LE's reclined    COMMUNICATION/EDUCATION:   The patients plan of care was discussed with: Physical therapist, Registered nurse, and Physician. Home safety education was provided and the patient/caregiver indicated understanding. and Patient/family have participated as able in goal setting and plan of care. This patients plan of care is appropriate for delegation to [facility].     Thank you for this referral.  Laray Hashimoto, OTR/L  Time Calculation: 34 mins

## 2022-11-22 NOTE — PROGRESS NOTES
Bedside shift change report given to Summer Paredes RN (oncoming nurse) by Sheyla Valverde RN (offgoing nurse). Report included the following information SBAR, Kardex, Intake/Output, MAR, and Recent Results.

## 2022-11-22 NOTE — PROGRESS NOTES
Problem: Self Care Deficits Care Plan (Adult)  Goal: *Acute Goals and Plan of Care (Insert Text)  Description: FUNCTIONAL STATUS PRIOR TO ADMISSION: Patient was independent and active without use of DME.     HOME SUPPORT: The patient lived with her  but did not require assist.    Occupational Therapy Goals  Initiated 11/22/2022  1. Patient will perform lower body dressing with minimal assistance/contact guard assist and best technique/adaptive equipment within 7 day(s). 2.  Patient will perform stand pivot transfer to drop arm bedside commode and maintain NWB left LE with minimal assistance within 7 day(s). 3.  Patient will perform all aspects of toileting with minimal assistance/contact guard assist within 7 day(s). 4.  Patient will perform bilateral UE strengthening exercises independently within 7 day(s). 11/22/2022 1417 by Darnell Dickson OTR/L  Outcome: Progressing Towards Goal    OCCUPATIONAL THERAPY TREATMENT  Patient: Obdulia Snell (64 y.o. female)  Date: 11/22/2022  Diagnosis: Tibia fracture [S82.209A] <principal problem not specified>  Procedure(s) (LRB):  TIBIAL PLATEAU OPEN REDUCTION INTERNAL FIXATION (Left) 2 Days Post-Op  Precautions: NWB (LLE with brace locked in ext)  Chart, occupational therapy assessment, plan of care, and goals were reviewed. ASSESSMENT  Patient continues with skilled OT services and is progressing towards goals. Patient demonstrated improved ability to maintain NWB left LE and assist to initially hold LE up during transfer to stand beneficial, Patients  bringing in shoe which will assist with transfers     Current Level of Function Impacting Discharge (ADLs): mod assist of 2 for stand pivot transfer    Other factors to consider for discharge:          PLAN :  Patient continues to benefit from skilled intervention to address the above impairments. Continue treatment per established plan of care to address goals.     Recommend with staff: assist of 2 for stand pivot to chair    Recommend next OT session: per goals    Recommendation for discharge: (in order for the patient to meet his/her long term goals)  Occupational therapy at least 2 days/week in the home AND ensure assist and/or supervision for safety with ADL's and transfers    This discharge recommendation:  Has been made in collaboration with the attending provider and/or case management    IF patient discharges home will need the following DME:        SUBJECTIVE:   Patient stated that's going to be hard.  re: resting when tired    OBJECTIVE DATA SUMMARY:   Cognitive/Behavioral Status:  Neurologic State: Alert  Orientation Level: Oriented X4  Cognition: Follows commands;Decreased attention/concentration; Appropriate safety awareness; Appropriate for age attention/concentration; Appropriate decision making  Perception: Appears intact  Perseveration: No perseveration noted  Safety/Judgement: Awareness of environment; Fall prevention;Good awareness of safety precautions; Home safety; Insight into deficits    Functional Mobility and Transfers for ADLs:  Bed Mobility:  Rolling: Minimum assistance  Supine to Sit: Moderate assistance;Assist x2  Scooting: Moderate assistance;Assist x2    Transfers:  Sit to Stand: Moderate assistance;Assist x2  Functional Transfers  Bed to Chair: Assist x2;Adaptive equipment; Additional time; Moderate assistance    Balance:  Sitting: Intact  Standing: Impaired  Standing - Static: Fair;Constant support  Standing - Dynamic : Constant support;Poor    ADL Intervention:     Educated on fall prevention, set-up of wheelchair for transfers to right LE as able, educated on use of bariatric drop arm commode in event easier at night and provided handout    Educated on benefit of shoe for right LE            Cognitive Retraining  Safety/Judgement: Awareness of environment; Fall prevention;Good awareness of safety precautions; Home safety; Insight into deficits      Pain:  Not rated left LE    Activity Tolerance:   Good and Fair    After treatment patient left in no apparent distress:   Supine in bed, Call bell within reach, Caregiver / family present, and Side rails x 3    COMMUNICATION/COLLABORATION:   The patients plan of care was discussed with: Physical therapist and Registered nurse.      Leopold Luster, OTR/L  Time Calculation: 20 mins

## 2022-11-22 NOTE — PROGRESS NOTES
11/22/2022  4:24 PM  Care Management Progress Note      ICD-10-CM ICD-9-CM    1. Tibial plateau fracture, left, closed, initial encounter  S82.142A 823.00           RUR:  10%  Risk Level: [x]Low []Moderate []High  Value-based purchasing: [] Yes [x] No  Bundle patient: [] Yes [x] No   Specify:     Transition of care plan:  Awaiting medical clearance and DC order. PT/OT treating. Ortho following. Home with [facility] - CM consult for [facility] noted. CM met with pt to discuss dispo. Pt reported she prefers [facility] services. [place] [facility] offered, and pt indicated [facility] to [facility] as preference. CM completed referral via [employer], and is awaiting response. CM consult for DME noted. Pt reported a family member has all required DME, including a wheelchair, and is securing elevated leg rests. Pt will notify CM if additional DME is needed. Outpatient follow-up. Transport need TBD.

## 2022-11-22 NOTE — PROGRESS NOTES
Stefan Bhatelsen [facility]  [address] [facility], [address] [place] [address]  [phone] [facility] Adult  Hospitalist Group                                                                                          Hospitalist Progress Note  Dioni Grace MD        Date of Service:  2022  NAME:  Suan Holstein  :  1945  MRN:  [id number]      Admission Summary:   56-year-old female is admitted after a fall and found to have a proximal tibia and fibula fracture    Interval history / Subjective:   Reports pain is controlled with Percocet     Assessment & Plan:     Depressed left tibial plateau and fibular head fracture  -S/p tibial plateau open reduction internal fixation on   -PT and OT. Nonweightbearing left lower extremity with T scope knee brace unlocked position at all times. Plan for nonweightbearing for 10 to 12 weeks  -Continue pain regimen for now  -Lovenox for 30 days  -Further plan to DC home with home health but patient will need DME. CM will assist with this    Hypertension  -Continue valsartan    Hyperlipidemia  -Continue low-dose statin    Code status: Full code  [facility]       Hospital Problems  Never Reviewed            Codes Class Noted POA    Tibia fracture ICD-10-CM: S82.209A  ICD-9-CM: 823.80  2022 Unknown           Review of Systems:   A comprehensive review of systems was negative except for that written in the HPI. Vital Signs:    Last 24hrs VS reviewed since prior progress note.  Most recent are:  Visit Vitals  /70 (BP Patient Position: Sitting)   Pulse 83   Temp 98.1 °F (36.7 °C)   Resp 16   Ht 5' 5\" (1.651 m)   Wt 68 kg (150 lb)   SpO2 97%   BMI 24.96 kg/m²         Intake/Output Summary (Last 24 hours) at 2022 1624  Last data filed at 2022 1447  Gross per 24 hour   Intake 2745.83 ml   Output 1250 ml   Net 1495.83 ml          Physical Examination:             Constitutional:  No acute distress, cooperative, pleasant    ENT:  Oral mucosa moist, oropharynx benign. Resp:  CTA bilaterally. No wheezing/rhonchi/rales. No accessory muscle use   CV:  Regular rhythm, normal rate, no murmurs, gallops, rubs    GI:  Soft, non distended, non tender. normoactive bowel sounds, no hepatosplenomegaly     Musculoskeletal:  No edema, warm, 2+ pulses throughout    Neurologic:  Moves all extremities. AAOx3, CN II-XII reviewed     Psych:  Good insight, Not anxious nor agitated. Data Review:    Review and/or order of clinical lab test      Labs:     Recent Labs     11/22/22  0617 11/21/22  1631 11/20/22  0430   WBC 5.0  --  6.1   HGB 8.6* 10.2* 11.6   HCT 26.4* 31.7* 35.7     --  229       Recent Labs     11/22/22  0617 11/20/22  0430 11/19/22  1715    136 138   K 3.8 3.9 3.8    103 104   CO2 26 28 29   BUN 5* 10 13   CREA 0.41* 0.56 0.75   GLU 86 116* 119*   CA 7.8* 9.3 9.6       Recent Labs     11/19/22  1715   ALT 23   AP 72   TBILI 0.9   TP 7.5   ALB 3.8   GLOB 3.7       Recent Labs     11/19/22  1715   INR 1.0   PTP 10.2        No results for input(s): FE, TIBC, PSAT, FERR in the last 72 hours. No results found for: FOL, RBCF   No results for input(s): PH, PCO2, PO2 in the last 72 hours. No results for input(s): CPK, CKNDX, TROIQ in the last 72 hours.     No lab exists for component: CPKMB  No results found for: CHOL, CHOLX, CHLST, CHOLV, HDL, HDLP, LDL, LDLC, DLDLP, TGLX, TRIGL, TRIGP, CHHD, CHHDX  No results found for: GLUCPOC  No results found for: COLOR, APPRN, SPGRU, REFSG, GILLES, PROTU, GLUCU, KETU, BILU, UROU, SAMEER, LEUKU, GLUKE, EPSU, BACTU, WBCU, RBCU, CASTS, UCRY      Medications Reviewed:     Current Facility-Administered Medications   Medication Dose Route Frequency    0.9% sodium chloride infusion  125 mL/hr IntraVENous CONTINUOUS    enoxaparin (LOVENOX) injection 40 mg  40 mg SubCUTAneous Q24H    oxyCODONE-acetaminophen (PERCOCET) 5-325 mg per tablet 2 Tablet  2 Tablet Oral Q4H PRN Or    oxyCODONE-acetaminophen (PERCOCET) 5-325 mg per tablet 1 Tablet  1 Tablet Oral Q4H PRN    atorvastatin (LIPITOR) tablet 10 mg  10 mg Oral QHS    valsartan (DIOVAN) tablet 160 mg  160 mg Oral DAILY    ondansetron (ZOFRAN) injection 4 mg  4 mg IntraVENous Q6H PRN    HYDROmorphone (DILAUDID) injection 1 mg  1 mg IntraVENous Q4H PRN     ______________________________________________________________________  EXPECTED LENGTH OF STAY: 2d 16h  ACTUAL LENGTH OF STAY:          3                 Beto Vázquez MD

## 2022-11-22 NOTE — PROGRESS NOTES
Medicare pt has received, reviewed, and signed 2nd IM letter informing them of their right to appeal the discharge. Signed copy has been placed on pt bedside chart.   Cherelle Bautista CMS

## 2022-11-22 NOTE — PROGRESS NOTES
Problem: Mobility Impaired (Adult and Pediatric)  Goal: *Acute Goals and Plan of Care (Insert Text)  Description: FUNCTIONAL STATUS PRIOR TO ADMISSION: Patient was independent and active without use of DME.    HOME SUPPORT PRIOR TO ADMISSION: The patient lived with  but did not require assist.    Physical Therapy Goals  Initiated 11/22/2022  1. Patient will move from supine to sit and sit to supine  in bed with supervision/set-up within 7 day(s). 2.  Patient will transfer from bed to chair and chair to bed with minimal assistance/contact guard assist using the least restrictive device within 7 day(s). 3.  Patient will perform sit to stand with minimal assistance/contact guard assist within 7 day(s). 4.  Patient will ambulate with minimal assistance/contact guard assist for 5x2 feet with the least restrictive device within 7 day(s). 5.  Patient will ascend/descend  2 stairs with 1 handrail(s) with moderate assistance  within 7 day(s). Outcome: Progressing Towards Goal   PHYSICAL THERAPY EVALUATION  Patient: Mitra Ramos (30 y.o. female)  Date: 11/22/2022  Primary Diagnosis: Tibia fracture [S82.209A]  Procedure(s) (LRB):  TIBIAL PLATEAU OPEN REDUCTION INTERNAL FIXATION (Left) 2 Days Post-Op   Precautions: fall;  NWB (LLE with brace locked in ext)    ASSESSMENT  Based on the objective data described below, the patient presents with L tibial plateau fx due to GLF from dog knocking her down. She is now p.o. #2 from ORIF. She is received supine in bed with LLE in telescoping knee brace locked in ext at all times. She is A&Ox4. Pt educated with mechanics of knee brace for locking and strap secure. Pt needing Mod Ax2 with sit to supine to EOB. Good sitting balance. Educated with NWB status and process for RW use. Pt able to maintain NWB and progressing RLE via pivot vs unloading. Gait of 3'x4 with RW and Mod Ax2.   Bed to chair and chair to bed practiced leading to strong side/R.  BUE with decreased strength limiting pt from fully unloading R foot for gait. Stair management into home will likely be difficult. W/c order placed and explained to family for home entry process with handout. RW likewise ordered. Recommending BSC as well. Home with HHPT and family assist communicated to CM, pt and family. Pt will be residing on first flr of home. Current Level of Function Impacting Discharge (mobility/balance): Mod Ax2/fair    Functional Outcome Measure: The patient scored 45/100 on the barthel outcome measure      Other factors to consider for discharge: per abovee     Patient will benefit from skilled therapy intervention to address the above noted impairments. PLAN :  Recommendations and Planned Interventions: bed mobility training, transfer training, gait training, therapeutic exercises, neuromuscular re-education, edema management/control, patient and family training/education, and therapeutic activities      Frequency/Duration: Patient will be followed by physical therapy:  5 times a week to address goals. Recommendation for discharge: (in order for the patient to meet his/her long term goals)  Physical therapy at least 2 days/week in the home     This discharge recommendation:  Has been made in collaboration with the attending provider and/or case management    IF patient discharges home will need the following DME: rolling walker and wheelchair         SUBJECTIVE:   Patient stated I want to go home for sure.     OBJECTIVE DATA SUMMARY:   HISTORY:    Past Medical History:   Diagnosis Date    Hypertension     Ill-defined condition     elevated cholesterol   History reviewed. No pertinent surgical history.     Personal factors and/or comorbidities impacting plan of care: per above and below    Home Situation  Home Environment: Private residence  # Steps to Enter: 2  Rails to Enter: Yes  One/Two Story Residence: Two story, live on 1st floor  # of Interior Steps: 18  Living Alone: No  Support Systems: Spouse/Significant Other  Patient Expects to be Discharged to[de-identified] Home with home health  Current DME Used/Available at Home: Walker, rolling  Tub or Shower Type: Shower    EXAMINATION/PRESENTATION/DECISION MAKING:   Critical Behavior:  Neurologic State: Alert  Orientation Level: Oriented X4  Cognition: Appropriate decision making, Follows commands     Hearing: Auditory  Auditory Impairment: None  Skin:  IV; dsg  and brace LLE  Edema: LLE  Range Of Motion:  AROM: Within functional limits (excluding LLE in knee brace locked in ext)                       Strength:    Strength: Generally decreased, functional                    Tone & Sensation:   Tone: Normal              Sensation: Intact               Coordination:  Coordination: Within functional limits  Vision:      Functional Mobility:  Bed Mobility:  Rolling: Minimum assistance  Supine to Sit: Moderate assistance;Assist x2     Scooting: Moderate assistance;Assist x2  Transfers:  Sit to Stand: Moderate assistance;Assist x2  Stand to Sit: Moderate assistance;Assist x2                       Balance:   Sitting: Intact  Standing: Impaired  Standing - Static: Fair;Constant support  Standing - Dynamic : Constant support;Poor  Ambulation/Gait Training:  Distance (ft): 3 Feet (ft) (x2)  Assistive Device: Walker, rolling;Gait belt;Brace/Splint  Ambulation - Level of Assistance: Assist x2        Gait Abnormalities: Antalgic;Decreased step clearance     Left Side Weight Bearing: Non-weight bearing  Base of Support: Narrowed; Shift to right  Stance: Right increased  Speed/Deanne: Slow;Pace decreased (<100 feet/min); Shuffled                              Therapeutic Exercises: Ankle pumping and spirometry    Functional Measure:  Barthel Index:    Bathin  Bladder: 5  Bowels: 10  Groomin  Dressin  Feeding: 10  Mobility: 0  Stairs: 0  Toilet Use: 5  Transfer (Bed to Chair and Back): 5  Total: 45/100       The Barthel ADL Index: Guidelines  1.  The index should be used as a record of what a patient does, not as a record of what a patient could do. 2. The main aim is to establish degree of independence from any help, physical or verbal, however minor and for whatever reason. 3. The need for supervision renders the patient not independent. 4. A patient's performance should be established using the best available evidence. Asking the patient, friends/relatives and nurses are the usual sources, but direct observation and common sense are also important. However direct testing is not needed. 5. Usually the patient's performance over the preceding 24-48 hours is important, but occasionally longer periods will be relevant. 6. Middle categories imply that the patient supplies over 50 per cent of the effort. 7. Use of aids to be independent is allowed. Score Interpretation (from [address] [address])    Independent   60-79 Minimally independent   40-59 Partially dependent   20-39 Very dependent   <20 Totally dependent     -Cristobal Ibarra., BarthelMIHAELAW. (1965). Functional evaluation: the Barthel Index. [facility] ([address] (1997). The Barthel activities of daily living index: self-reporting versus actual performance in the old (> or = 75 years). Journal of [address] [place] [address] 45(7), 14 [facility], .JovanyMJovanyF, Jose Schmidt., Raza Ramsey. (1999). Measuring the change in disability after inpatient rehabilitation; comparison of the responsiveness of the Barthel Index and Functional Harnett Measure. Journal of Neurology, Neurosurgery, and Psychiatry, 66(4), 121-196. NAY Rosenberg.ANATOLIY.SHRUTHI, BRINDA Roca, & Kim Escobar M.A. (2004) Assessment of post-stroke quality of life in cost-effectiveness studies: The usefulness of the Barthel Index and the EuroQoL-5D.  Quality of Life Research, 15, 171-10           Physical Therapy Evaluation Charge Determination   History Examination Presentation Decision-Making MEDIUM  Complexity : 1-2 comorbidities / personal factors will impact the outcome/ POC  MEDIUM Complexity : 3 Standardized tests and measures addressing body structure, function, activity limitation and / or participation in recreation  MEDIUM Complexity : Evolving with changing characteristics  Other outcome measures barthel  MEDIUM      Based on the above components, the patient evaluation is determined to be of the following complexity level: MEDIUM    Pain Rating:  Well managed    Activity Tolerance:   Fair    After treatment patient left in no apparent distress:   Sitting in chair, Heels elevated for pressure relief, Call bell within reach, Bed / chair alarm activated, and Caregiver / family present    COMMUNICATION/EDUCATION:   The patients plan of care was discussed with: Occupational therapist, Registered nurse, and Case management. Fall prevention education was provided and the patient/caregiver indicated understanding., Patient/family have participated as able in goal setting and plan of care. , and Patient/family agree to work toward stated goals and plan of care.     Thank you for this referral.  Ken Mendeita, PT   Time Calculation: 64 mins

## 2022-11-23 ENCOUNTER — APPOINTMENT (OUTPATIENT)
Dept: GENERAL RADIOLOGY | Age: 77
DRG: 493 | End: 2022-11-23
Attending: PHYSICIAN ASSISTANT
Payer: MEDICARE

## 2022-11-23 LAB
HCT VFR BLD AUTO: 24.8 % (ref 35–47)
HGB BLD-MCNC: 8.1 G/DL (ref 11.5–16)

## 2022-11-23 PROCEDURE — 73630 X-RAY EXAM OF FOOT: CPT

## 2022-11-23 PROCEDURE — 77030041075 HC DRSG AG OPTIFRM MDII -B

## 2022-11-23 PROCEDURE — 77030038269 HC DRN EXT URIN PURWCK BARD -A

## 2022-11-23 PROCEDURE — 97530 THERAPEUTIC ACTIVITIES: CPT

## 2022-11-23 PROCEDURE — 74011250637 HC RX REV CODE- 250/637: Performed by: INTERNAL MEDICINE

## 2022-11-23 PROCEDURE — 74011250637 HC RX REV CODE- 250/637: Performed by: PHYSICIAN ASSISTANT

## 2022-11-23 PROCEDURE — 36415 COLL VENOUS BLD VENIPUNCTURE: CPT

## 2022-11-23 PROCEDURE — 65270000029 HC RM PRIVATE

## 2022-11-23 PROCEDURE — 97535 SELF CARE MNGMENT TRAINING: CPT

## 2022-11-23 PROCEDURE — 74011250637 HC RX REV CODE- 250/637: Performed by: HOSPITALIST

## 2022-11-23 PROCEDURE — 74011250636 HC RX REV CODE- 250/636: Performed by: PHYSICIAN ASSISTANT

## 2022-11-23 PROCEDURE — 97110 THERAPEUTIC EXERCISES: CPT

## 2022-11-23 PROCEDURE — 85018 HEMOGLOBIN: CPT

## 2022-11-23 RX ORDER — POLYETHYLENE GLYCOL 3350 17 G/17G
17 POWDER, FOR SOLUTION ORAL 2 TIMES DAILY
Status: DISCONTINUED | OUTPATIENT
Start: 2022-11-23 | End: 2022-11-25 | Stop reason: HOSPADM

## 2022-11-23 RX ORDER — AMOXICILLIN 250 MG
1 CAPSULE ORAL DAILY
Status: DISCONTINUED | OUTPATIENT
Start: 2022-11-24 | End: 2022-11-25 | Stop reason: HOSPADM

## 2022-11-23 RX ADMIN — OXYCODONE AND ACETAMINOPHEN 1 TABLET: 5; 325 TABLET ORAL at 23:35

## 2022-11-23 RX ADMIN — OXYCODONE AND ACETAMINOPHEN 1 TABLET: 5; 325 TABLET ORAL at 14:46

## 2022-11-23 RX ADMIN — OXYCODONE AND ACETAMINOPHEN 1 TABLET: 5; 325 TABLET ORAL at 10:18

## 2022-11-23 RX ADMIN — ENOXAPARIN SODIUM 40 MG: 100 INJECTION SUBCUTANEOUS at 10:18

## 2022-11-23 RX ADMIN — OXYCODONE AND ACETAMINOPHEN 1 TABLET: 5; 325 TABLET ORAL at 05:45

## 2022-11-23 RX ADMIN — OXYCODONE AND ACETAMINOPHEN 1 TABLET: 5; 325 TABLET ORAL at 18:55

## 2022-11-23 RX ADMIN — VALSARTAN 160 MG: 80 TABLET, FILM COATED ORAL at 10:18

## 2022-11-23 RX ADMIN — ATORVASTATIN CALCIUM 10 MG: 10 TABLET, FILM COATED ORAL at 22:06

## 2022-11-23 RX ADMIN — OXYCODONE AND ACETAMINOPHEN 1 TABLET: 5; 325 TABLET ORAL at 00:42

## 2022-11-23 RX ADMIN — POLYETHYLENE GLYCOL 3350 17 G: 17 POWDER, FOR SOLUTION ORAL at 18:55

## 2022-11-23 NOTE — PROGRESS NOTES
Orthopaedic Progress Note  Post Op day: 2 Days Post-Op    2022 9:40 PM     Patient: Patrizia Garcia MRN: [id number]  SSN: [id number]    YOB: 1945  Age: 77 y.o. Sex: female      Admit date:  2022  Date of Surgery:  2022   Procedures:  Procedure(s):  TIBIAL PLATEAU OPEN REDUCTION INTERNAL FIXATION  Admitting Physician:  Geo Narvaez MD   Surgeon:  Sukhjinder Rios) and Role:     Tiana Coleman MD - Primary    Consulting Physician(s): Treatment Team: Attending Provider: Yogesh Box MD; Consulting Provider: Fatou Schilling MD; Physician Assistant: MACKENZIE Love; Care Manager: Jody Warner Primary Nurse: Rojas Pressley RN    SUBJECTIVE:     Patrizia Garcia is a 77 y.o. female is 2 Days Post-Op s/p Procedure(s):  TIBIAL PLATEAU OPEN REDUCTION INTERNAL FIXATION with an appropriate level of post-operative pain. Complains of some issues overnight with pain control. Reports her dressing was \"wet\" last night, but reinforced overnight. No complaints of nausea, vomiting, dizziness, lightheadedness, chest pain, or shortness of breath. OBJECTIVE:       Physical Exam:  General: Alert, cooperative, no distress. Respiratory: Respirations unlabored  Neurological:  Neurovascular exam within normal limits. Motor: + DF/PF. Musculoskeletal: Calves soft, supple, non-tender upon palpation. Edema in the left foot. Moves toes. +PF and DF LLE.  +DP and PT. T scope unlocked- no pain with calf compression. BCR of all digits. Dressing/Wound:  Clean, dry and intact. No significant erythema or swelling.       Vital Signs:      Patient Vitals for the past 8 hrs:   BP Temp Pulse Resp SpO2   22 2036 127/77 97.9 °F (36.6 °C) 89 16 95 %   22 1714 124/66 98 °F (36.7 °C) 83 16 96 %                                          Temp (24hrs), Av.7 °F (37.1 °C), Min:97.9 °F (36.6 °C), Max:99.3 °F (37.4 °C)      Labs:        Recent Labs 11/22/22  0617   HCT 26.4*   HGB 8.6*     Lab Results   Component Value Date/Time    Sodium 137 11/22/2022 06:17 AM    Potassium 3.8 11/22/2022 06:17 AM    Chloride 106 11/22/2022 06:17 AM    CO2 26 11/22/2022 06:17 AM    Glucose 86 11/22/2022 06:17 AM    BUN 5 (L) 11/22/2022 06:17 AM    Creatinine 0.41 (L) 11/22/2022 06:17 AM    Calcium 7.8 (L) 11/22/2022 06:17 AM       PT/OT:        Gait  Base of Support: Narrowed, Shift to right  Speed/Deanne: Slow, Pace decreased (<100 feet/min), Shuffled  Stance: Right increased  Gait Abnormalities: Antalgic, Decreased step clearance  Ambulation - Level of Assistance: Assist x2  Distance (ft): 3 Feet (ft) (x2)  Assistive Device: Walker, rolling, Gait belt, Brace/Splint       Patient mobility  Bed Mobility Training  Rolling: Minimum assistance  Supine to Sit: Moderate assistance, Assist x2  Scooting: Moderate assistance, Assist x2  Transfer Training  Sit to Stand: Moderate assistance, Assist x2  Stand to Sit: Moderate assistance, Assist x2  Bed to Chair: Assist x2, Adaptive equipment, Additional time, Moderate assistance      Gait Training  Assistive Device: Walker, rolling, Gait belt, Brace/Splint  Ambulation - Level of Assistance: Assist x2  Distance (ft): 3 Feet (ft) (x2)   Weight Bearing Status  Left Side Weight Bearing: Non-weight bearing        ASSESSMENT / PLAN:   Active Problems:    Tibia fracture (11/19/2022)            A: 1. S/P Left tibial plateau ORIF POD 2          Pain Control: Roxicodone and Tylenol. Patient wants to avoid narcotics if possible. DVT Prophylaxis: SCD right leg. Lovenox 40 mg SC for 1 month. Hemodynamics: HgB 8.6. Monitor. ABLA. Activity: NWB LLE. T scoped locked in extension at all times. Disposition: SNF versus .       Signed By:  Burdette Spurling, [address] [place] [address]

## 2022-11-23 NOTE — PROGRESS NOTES
Problem: Self Care Deficits Care Plan (Adult)  Goal: *Acute Goals and Plan of Care (Insert Text)  Description: FUNCTIONAL STATUS PRIOR TO ADMISSION: Patient was independent and active without use of DME.     HOME SUPPORT: The patient lived with her  but did not require assist.    Occupational Therapy Goals  Initiated 11/22/2022  1. Patient will perform lower body dressing with minimal assistance/contact guard assist and best technique/adaptive equipment within 7 day(s). 2.  Patient will perform stand pivot transfer to drop arm bedside commode and maintain NWB left LE with minimal assistance within 7 day(s). 3.  Patient will perform all aspects of toileting with minimal assistance/contact guard assist within 7 day(s). 4.  Patient will perform bilateral UE strengthening exercises independently within 7 day(s). Outcome: Progressing Towards Goal   OCCUPATIONAL THERAPY TREATMENT  Patient: Shiva Burnette (89 y.o. female)  Date: 11/23/2022  Diagnosis: Tibia fracture [S82.209A] <principal problem not specified>  Procedure(s) (LRB):  TIBIAL PLATEAU OPEN REDUCTION INTERNAL FIXATION (Left) 3 Days Post-Op  Precautions: NWB (LLE with brace locked in ext)  Chart, occupational therapy assessment, plan of care, and goals were reviewed. ASSESSMENT  Patient continues with skilled OT services and is progressing towards goals. Pt educated to AE and able to doff R sock with use of dressing stick and  dons R shoe with long handle shoe horn. She stands with min to mod assist x 2 for sit to stand to transfer to chair. Pt given handouts for UE/LE exercises and shown correct form for exercises. Current Level of Function Impacting Discharge (ADLs): Min to mod assist x 2 for sit to  prep for ADl's, contact guard to don R shoe    Other factors to consider for discharge:          PLAN :  Patient continues to benefit from skilled intervention to address the above impairments.   Continue treatment per established plan of care to address goals. Recommend with staff: ADL's, there ex, there act    Recommend next OT session: cont towards goals    Recommendation for discharge: (in order for the patient to meet his/her long term goals)  Occupational therapy at least 2 days/week in the home AND ensure assist and/or supervision for safety with ADL's    This discharge recommendation:  Has not yet been discussed the attending provider and/or case management    IF patient discharges home will need the following DME:        SUBJECTIVE:   Patient stated I used to walk a lot.     OBJECTIVE DATA SUMMARY:   Cognitive/Behavioral Status:  Neurologic State: Alert  Orientation Level: Oriented X4  Cognition: Follows commands             Functional Mobility and Transfers for ADLs:  Bed Mobility:   Min assist supine to sit    Transfers:  Sit to Stand: Minimum assistance;Assist x2; Moderate assistance          Balance:  Sitting: Intact  Standing: Impaired    ADL Intervention:                 Type of Bath: Chlorhexidine (CHG)              Lower Body Dressing Assistance  Slip on Shoes with Back: Stand-by assistance  Position Performed: Seated in chair  Adaptive Equipment Used: Long handled shoe horn;Dressing stick              Therapeutic Exercises:   Pt given hand out for UE/LE exercises and reviewed correct form for arm push-ups      Activity Tolerance:   Fair    After treatment patient left in no apparent distress:   Sitting in chair and Call bell within reach    COMMUNICATION/COLLABORATION:   The patients plan of care was discussed with: Physical therapist, Occupational therapist, and Registered nurse.      LISANDRO Giraldo/L  Time Calculation: 27 mins

## 2022-11-23 NOTE — PROGRESS NOTES
Orthopaedic Progress Note  Post Op day: 3 Days Post-Op    November 23, 2022 3:26 PM     Patient: Obdulia Snell MRN: [id number]  SSN: [id number]    YOB: 1945  Age: 77 y.o. Sex: female      Admit date:  11/19/2022  Date of Surgery:  11/20/2022   Procedures:  Procedure(s):  TIBIAL PLATEAU OPEN REDUCTION INTERNAL FIXATION  Admitting Physician:  Frandy Hdez MD   Surgeon:  Silvia Yu) and Role:     Diana Zafar MD - Primary    Consulting Physician(s): Treatment Team: Attending Provider: Ester Matos MD; Consulting Provider: Dorian Rivas MD; Physician Assistant: MACKENZIE Esparza; Care Manager: Theresa Martino Primary Nurse: Lyle Early RN; Physical Therapist: Serafin Carter PT, DPT; Occupational Therapy Assistant: KEN Henderson    SUBJECTIVE:     Obdulia Snell is a 77 y.o. female is 3 Days Post-Op s/p Procedure(s):  TIBIAL PLATEAU OPEN REDUCTION INTERNAL FIXATION with an appropriate level of post-operative pain. Reports her pain has been well controlled. Complains of left foot swelling. Denies calf pain. No complaints of nausea, vomiting, dizziness, lightheadedness, chest pain, or shortness of breath. OBJECTIVE:       Physical Exam:  General: Alert, cooperative, no distress. Respiratory: Respirations unlabored  Neurological:  Neurovascular exam within normal limits. Motor: + DF/PF. Musculoskeletal: Calves soft, supple, non-tender upon palpation. Edema in the left foot. Moves toes. +PF and DF LLE.  +DP and PT. T scope unlocked- no pain with calf compression. BCR of all digits. Dressing/Wound:  Clean, dry and intact. No significant erythema or swelling. Dressing removed. New dressing applied. Incision without erythema or drainage. Skin intact. IMAGING:  INDICATION: Left foot pain- likely from edema, but recent trauma exclude  metatarsal fracture. COMPARISON: None.      FINDINGS: Three views of the left foot demonstrate no fracture or other acute  osseous or articular abnormality. . Achilles and plantar calcaneal  enthesophytes. Diffuse soft tissue edema. IMPRESSION  No acute abnormality. Vital Signs:      Patient Vitals for the past 8 hrs:   BP Temp Pulse Resp SpO2   22 1126 124/65 98.4 °F (36.9 °C) 78 18 95 %   22 0841 129/65 97.4 °F (36.3 °C) 86 16 99 %                                          Temp (24hrs), Av.1 °F (36.7 °C), Min:97.4 °F (36.3 °C), Max:98.9 °F (37.2 °C)      Labs:        Recent Labs     22  0554   HCT 24.8*   HGB 8.1*     Lab Results   Component Value Date/Time    Sodium 137 2022 06:17 AM    Potassium 3.8 2022 06:17 AM    Chloride 106 2022 06:17 AM    CO2 26 2022 06:17 AM    Glucose 86 2022 06:17 AM    BUN 5 (L) 2022 06:17 AM    Creatinine 0.41 (L) 2022 06:17 AM    Calcium 7.8 (L) 2022 06:17 AM       PT/OT:        Gait  Base of Support: Narrowed, Shift to right  Speed/Deanne: Slow, Pace decreased (<100 feet/min), Shuffled  Stance: Right increased  Gait Abnormalities: Antalgic, Decreased step clearance  Ambulation - Level of Assistance: Assist x2  Distance (ft): 3 Feet (ft) (x2)  Assistive Device: Walker, rolling, Gait belt, Brace/Splint       Patient mobility  Bed Mobility Training  Rolling: Minimum assistance  Supine to Sit: Moderate assistance, Assist x2  Scooting: Moderate assistance, Assist x2  Transfer Training  Sit to Stand: Moderate assistance, Assist x2  Stand to Sit: Moderate assistance, Assist x2  Bed to Chair: Assist x2, Adaptive equipment, Additional time, Moderate assistance      Gait Training  Assistive Device: Walker, rolling, Gait belt, Brace/Splint  Ambulation - Level of Assistance: Assist x2  Distance (ft): 3 Feet (ft) (x2)   Weight Bearing Status  Left Side Weight Bearing: Non-weight bearing        ASSESSMENT / PLAN:   Active Problems:    Tibia fracture (2022)         A: 1.  S/P Left tibial plateau ORIF POD 3             Pain Control: Roxicodone and Tylenol. Patient wants to avoid narcotics if possible. DVT Prophylaxis: SCD right leg. Lovenox 40 mg SC for 1 month. Hemodynamics: HgB 8.1. Monitor. ABLA. If less than 7 transfuse   Activity: NWB LLE. T scoped locked in extension at all times. Disposition: SNF versus . F/U with Dr. oRn Calhoun in 2 weeks. DC in chart. Orthopedics will formally sign off. Left foot xray without andrea abnormality.       Signed By:  Martin Be, [address] [facility] 114

## 2022-11-23 NOTE — PROGRESS NOTES
11/23/2022  12:18 PM  Care Management Progress Note      ICD-10-CM ICD-9-CM    1. Tibial plateau fracture, left, closed, initial encounter  S82.142A 823.00           RUR:  9%  Risk Level: [x]Low []Moderate []High  Value-based purchasing: [] Yes [x] No  Bundle patient: [] Yes [x] No   Specify:     Transition of care plan:  Awaiting medical clearance and DC order. PT/OT treating. Ortho following. Home with [facility] with [facility], and they were updated on pt's status. They will call pt to schedule [facility] for Sat or Sun. Outpatient follow-up. Transport need TBD. Family vs Wheelchair [facility] pending progress.

## 2022-11-23 NOTE — PROGRESS NOTES
Problem: Mobility Impaired (Adult and Pediatric)  Goal: *Acute Goals and Plan of Care (Insert Text)  Description: FUNCTIONAL STATUS PRIOR TO ADMISSION: Patient was independent and active without use of DME.    HOME SUPPORT PRIOR TO ADMISSION: The patient lived with  but did not require assist.    Physical Therapy Goals  Initiated 11/22/2022  1. Patient will move from supine to sit and sit to supine  in bed with supervision/set-up within 7 day(s). 2.  Patient will transfer from bed to chair and chair to bed with minimal assistance/contact guard assist using the least restrictive device within 7 day(s). 3.  Patient will perform sit to stand with minimal assistance/contact guard assist within 7 day(s). 4.  Patient will ambulate with minimal assistance/contact guard assist for 5x2 feet with the least restrictive device within 7 day(s). 5.  Patient will ascend/descend  2 stairs with 1 handrail(s) with moderate assistance  within 7 day(s). Outcome: Progressing Towards Goal   PHYSICAL THERAPY TREATMENT  Patient: Linda Hunt (41 y.o. female)  Date: 11/23/2022  Diagnosis: Tibia fracture [S82.209A] <principal problem not specified>  Procedure(s) (LRB):  TIBIAL PLATEAU OPEN REDUCTION INTERNAL FIXATION (Left) 3 Days Post-Op  Precautions: NWB (LLE with brace locked in ext)  Chart, physical therapy assessment, plan of care and goals were reviewed. ASSESSMENT  Patient continues with skilled PT services and is progressing slowly towards goals. Patient is highly motivated for progress and making gains but remains limited by difficulty with transfers and clearing her right foot for hopping with transfers and gait. She required moderate assist x1-2 for bed mobility and sit to stand transfers. She was able to maintain left NWB and completed a heel/toe pivot using a RW to transfer from bed to chair.  Completed a 2nd trial to attempt forward mobility and able hop ~3' with moderate assist x1-2 using the RW but greatly challenged with right LE clearance and each \"step\" remained 2-3\". Left in bedside chair with LLE elevated and in NAD. The patient continues to require moderate assist x1-2 for most mobility and fatigues quickly in standing. She strongly prefers home and will require 24 hour physical assistance with HHPT follow up. If home, recommend functioning at the wheelchair level with assistance for transfers. She plans to have assist to carry her into the home via wheelchair. Current Level of Function Impacting Discharge (mobility/balance): moderate assist x1-2 for bed mobility and transfers             PLAN :  Patient continues to benefit from skilled intervention to address the above impairments. Continue treatment per established plan of care. to address goals. Recommendation for discharge: (in order for the patient to meet his/her long term goals)  Physical therapy at least 2 days/week in the home AND ensure assist and/or supervision for safety with all transfers    This discharge recommendation:  Has been made in collaboration with the attending provider and/or case management    IF patient discharges home will need the following DME: patient's sister owns or is addressing DME       SUBJECTIVE:   Patient stated Is am to limited in what I can do to spend an hour a day working on transfers in rehab. \"    OBJECTIVE DATA SUMMARY:   Critical Behavior:  Neurologic State: Alert  Orientation Level: Oriented X4  Cognition: Follows commands  Safety/Judgement: Awareness of environment, Fall prevention, Good awareness of safety precautions, Home safety, Insight into deficits  Functional Mobility Training:  Bed Mobility:     Supine to Sit: Moderate assistance;Assist x1;Additional time     Scooting: Moderate assistance;Assist x2; Additional time        Transfers:  Sit to Stand: Minimum assistance;Assist x2; Moderate assistance  Stand to Sit: Minimum assistance; Moderate assistance;Assist x2        Bed to Chair: Moderate assistance; Additional time                    Balance:  Sitting: Intact  Standing: Impaired  Standing - Static: Fair;Constant support  Standing - Dynamic : Fair;Constant support  Ambulation/Gait Training:  Distance (ft): 3 Feet (ft) (forward and back to chair)  Assistive Device: Gait belt;Walker, rolling  Ambulation - Level of Assistance: Moderate assistance; Additional time        Gait Abnormalities: Decreased step clearance;Shuffling gait        Base of Support: Narrowed  Stance: Right increased  Speed/Deanne: Slow;Pace decreased (<100 feet/min)                       Stairs: Therapeutic Exercises:   Provided handout for ankle pumps ,quad sets, and glute sets x10  Pain Rating:      Activity Tolerance:   Fair    After treatment patient left in no apparent distress:   Sitting in chair, Heels elevated for pressure relief, Call bell within reach, and Bed / chair alarm activated    COMMUNICATION/COLLABORATION:   The patients plan of care was discussed with: Registered nurse.      Sarah Cortes, PT, DPT   Time Calculation: 30 mins

## 2022-11-23 NOTE — PROGRESS NOTES
Problem: Falls - Risk of  Goal: *Absence of Falls  Description: Document Sincere Chanel Fall Risk and appropriate interventions in the flowsheet. Outcome: Progressing Towards Goal  Note: Fall Risk Interventions:  Mobility Interventions: Bed/chair exit alarm, Communicate number of staff needed for ambulation/transfer, Patient to call before getting OOB, Utilize gait belt for transfers/ambulation, Utilize walker, cane, or other assistive device         Medication Interventions: Bed/chair exit alarm, Teach patient to arise slowly, Patient to call before getting OOB, Utilize gait belt for transfers/ambulation    Elimination Interventions: Bed/chair exit alarm, Call light in reach, Patient to call for help with toileting needs    History of Falls Interventions: Bed/chair exit alarm, Investigate reason for fall, Room close to nurse's station, Utilize gait belt for transfer/ambulation         Problem: Patient Education: Go to Patient Education Activity  Goal: Patient/Family Education  Outcome: Progressing Towards Goal     Problem: Pain  Goal: *Control of Pain  Outcome: Progressing Towards Goal     Problem: Patient Education: Go to Patient Education Activity  Goal: Patient/Family Education  Outcome: Progressing Towards Goal     Problem: Pressure Injury - Risk of  Goal: *Prevention of pressure injury  Description: Document Taz Scale and appropriate interventions in the flowsheet.   Outcome: Progressing Towards Goal  Note: Pressure Injury Interventions:  Sensory Interventions: Assess changes in LOC, Float heels, Keep linens dry and wrinkle-free, Maintain/enhance activity level, Minimize linen layers         Activity Interventions: Assess need for specialty bed, Increase time out of bed, PT/OT evaluation    Mobility Interventions: HOB 30 degrees or less, Assess need for specialty bed, PT/OT evaluation    Nutrition Interventions: Document food/fluid/supplement intake, Offer support with meals,snacks and hydration Problem: Patient Education: Go to Patient Education Activity  Goal: Patient/Family Education  Outcome: Progressing Towards Goal     Problem: Patient Education: Go to Patient Education Activity  Goal: Patient/Family Education  Outcome: Progressing Towards Goal     Problem: Lower Extremity Fracture:Day of Admission  Goal: Off Pathway (Use only if patient is Off Pathway)  Outcome: Progressing Towards Goal  Goal: Activity/Safety  Outcome: Progressing Towards Goal  Goal: Consults, if ordered  Outcome: Progressing Towards Goal  Goal: Diagnostic Test/Procedures  Outcome: Progressing Towards Goal  Goal: Nutrition/Diet  Outcome: Progressing Towards Goal  Goal: Medications  Outcome: Progressing Towards Goal  Goal: Respiratory  Outcome: Progressing Towards Goal  Goal: Treatments/Interventions/Procedures  Outcome: Progressing Towards Goal  Goal: Psychosocial  Outcome: Progressing Towards Goal  Goal: *Optimal pain control at patient's stated goal  Outcome: Progressing Towards Goal  Goal: *Hemodynamically stable  Outcome: Progressing Towards Goal  Goal: *Adequate oxygenation  Outcome: Progressing Towards Goal     Problem: Lower Extremity Fracture:Day of Surgery (Intiate SCIP Measures for Post-op Care)  Goal: Off Pathway (Use only if patient is Off Pathway)  Outcome: Progressing Towards Goal  Goal: Activity/Safety  Outcome: Progressing Towards Goal  Goal: Consults, if ordered  Outcome: Progressing Towards Goal  Goal: Diagnostic Test/Procedures  Outcome: Progressing Towards Goal  Goal: Nutrition/Diet  Outcome: Progressing Towards Goal  Goal: Medications  Outcome: Progressing Towards Goal  Goal: Respiratory  Outcome: Progressing Towards Goal  Goal: Treatments/Interventions/Procedures  Outcome: Progressing Towards Goal  Goal: Psychosocial  Outcome: Progressing Towards Goal  Goal: *Optimal pain control at patient's stated goal  Outcome: Progressing Towards Goal  Goal: *Hemodynamically stable  Outcome: Progressing Towards Goal  Goal: *Adequate oxygenation  Outcome: Progressing Towards Goal     Problem: Lower Extremity Fracture:Post-op Day 1  Goal: Off Pathway (Use only if patient is Off Pathway)  Outcome: Progressing Towards Goal  Goal: Activity/Safety  Outcome: Progressing Towards Goal  Goal: Consults, if ordered  Outcome: Progressing Towards Goal  Goal: Diagnostic Test/Procedures  Outcome: Progressing Towards Goal  Goal: Nutrition/Diet  Outcome: Progressing Towards Goal  Goal: Discharge Planning  Outcome: Progressing Towards Goal  Goal: Medications  Outcome: Progressing Towards Goal  Goal: Respiratory  Outcome: Progressing Towards Goal  Goal: Treatments/Interventions/Procedures  Outcome: Progressing Towards Goal  Goal: Psychosocial  Outcome: Progressing Towards Goal  Goal: *Optimal pain control at patient's stated goal  Outcome: Progressing Towards Goal  Goal: *Hemodynamically stable  Outcome: Progressing Towards Goal  Goal: *Adequate oxygenation  Outcome: Progressing Towards Goal  Goal: *PT/INR within defined limits  Outcome: Progressing Towards Goal  Goal: *Demonstrates progressive activity  Outcome: Progressing Towards Goal     Problem: Lower Extremity Fracture:Post-op Day 2  Goal: Off Pathway (Use only if patient is Off Pathway)  Outcome: Progressing Towards Goal  Goal: Activity/Safety  Outcome: Progressing Towards Goal  Goal: Diagnostic Test/Procedures  Outcome: Progressing Towards Goal  Goal: Nutrition/Diet  Outcome: Progressing Towards Goal  Goal: Discharge Planning  Outcome: Progressing Towards Goal  Goal: Medications  Outcome: Progressing Towards Goal  Goal: Respiratory  Outcome: Progressing Towards Goal  Goal: Treatments/Interventions/Procedures  Outcome: Progressing Towards Goal  Goal: Psychosocial  Outcome: Progressing Towards Goal  Goal: *Optimal pain control at patient's stated goal  Outcome: Progressing Towards Goal  Goal: *Hemodynamically stable  Outcome: Progressing Towards Goal  Goal: *Adequate oxygenation  Outcome: Progressing Towards Goal  Goal: *PT/INR within defined limits  Outcome: Progressing Towards Goal  Goal: *Demonstrates progressive activity  Outcome: Progressing Towards Goal  Goal: *Voiding  Outcome: Progressing Towards Goal  Goal: *Bowel movement  Outcome: Progressing Towards Goal  Goal: *Tolerating diet  Outcome: Progressing Towards Goal     Problem: Lower Extremity Fracture:Post-op Day 3  Goal: Off Pathway (Use only if patient is Off Pathway)  Outcome: Progressing Towards Goal  Goal: Activity/Safety  Outcome: Progressing Towards Goal  Goal: Diagnostic Test/Procedures  Outcome: Progressing Towards Goal  Goal: Nutrition/Diet  Outcome: Progressing Towards Goal  Goal: Discharge Planning  Outcome: Progressing Towards Goal  Goal: Medications  Outcome: Progressing Towards Goal  Goal: Respiratory  Outcome: Progressing Towards Goal  Goal: Treatments/Interventions/Procedures  Outcome: Progressing Towards Goal  Goal: Psychosocial  Outcome: Progressing Towards Goal  Goal: *Optimal pain control at patient's stated goal  Outcome: Progressing Towards Goal  Goal: *Hemodynamically stable  Outcome: Progressing Towards Goal  Goal: *Adequate oxygenation  Outcome: Progressing Towards Goal  Goal: *PT/INR within defined limits  Outcome: Progressing Towards Goal  Goal: *Demonstrates progressive activity  Outcome: Progressing Towards Goal  Goal: *Voiding  Outcome: Progressing Towards Goal  Goal: *Bowel movement  Outcome: Progressing Towards Goal  Goal: *Tolerating diet  Outcome: Progressing Towards Goal

## 2022-11-23 NOTE — PROGRESS NOTES
Stefan Olguin [facility]  [address] [facility], [place], [address] [place] [address]  [phone]      Hospitalist Progress Note      NAME: Ramesh Nagy   :  1945  MRM:  [id number]    Date of service: 2022  8:50 AM       Assessment and Plan:   Depressed left tibial plateau and fibular head fracture-S/p tibial plateau open reduction internal fixation on . Nonweightbearing left lower extremity with T scope knee brace unlocked position at all times. Plan for nonweightbearing for 10 to 12 weeks. Cont pain management. PT/OT eval. Lovenox for 30 days if going to SNF, otherwise ASA if discharged home. 2.  Hypertension-Continue valsartan     3. Hyperlipidemia-Continue statin         Subjective:     Chief Complaint[de-identified] Patient was seen and examined as a follow up for tibial fracture. Chart was reviewed. no events. Needs to work more with PT/ OT    ROS:  (bold if positive, if negative)    Tolerating PT  Tolerating Diet        Objective:     Last 24hrs VS reviewed since prior progress note.  Most recent are:    Visit Vitals  /65 (BP 1 Location: Left upper arm, BP Patient Position: At rest)   Pulse 86   Temp 97.4 °F (36.3 °C)   Resp 16   Ht 5' 5\" (1.651 m)   Wt 68 kg (150 lb)   SpO2 99%   BMI 24.96 kg/m²     SpO2 Readings from Last 6 Encounters:   22 99%   17 96%    O2 Flow Rate (L/min): 2 l/min     Intake/Output Summary (Last 24 hours) at 2022 0850  Last data filed at 2022 0842  Gross per 24 hour   Intake 5157.92 ml   Output 1500 ml   Net 3657.92 ml        Physical Exam:    Gen:  Well-developed, well-nourished, in no acute distress  HEENT:  Pink conjunctivae, PERRL, hearing intact to voice, moist mucous membranes  Neck:  Supple, without masses, thyroid non-tender  Resp:  No accessory muscle use, clear breath sounds without wheezes rales or rhonchi  Card:  No murmurs, normal S1, S2 without thrills, bruits or peripheral edema  Abd:  Soft, non-tender, non-distended, normoactive bowel sounds are present, no palpable organomegaly and no detectable hernias  Lymph:  No cervical or inguinal adenopathy  Musc:  No cyanosis or clubbing  Skin:  No rashes or ulcers, skin turgor is good  Neuro:  Cranial nerves are grossly intact, no focal motor weakness, follows commands appropriately  Psych:  Good insight, oriented to person, place and time, alert  __________________________________________________________________  Medications Reviewed: (see below)  Medications:     Current Facility-Administered Medications   Medication Dose Route Frequency    enoxaparin (LOVENOX) injection 40 mg  40 mg SubCUTAneous Q24H    oxyCODONE-acetaminophen (PERCOCET) 5-325 mg per tablet 2 Tablet  2 Tablet Oral Q4H PRN    Or    oxyCODONE-acetaminophen (PERCOCET) 5-325 mg per tablet 1 Tablet  1 Tablet Oral Q4H PRN    atorvastatin (LIPITOR) tablet 10 mg  10 mg Oral QHS    valsartan (DIOVAN) tablet 160 mg  160 mg Oral DAILY    ondansetron (ZOFRAN) injection 4 mg  4 mg IntraVENous Q6H PRN    HYDROmorphone (DILAUDID) injection 1 mg  1 mg IntraVENous Q4H PRN        Lab Data Reviewed: (see below)  Lab Review:     Recent Labs     11/23/22  0554 11/22/22  0617 11/21/22  1631   WBC  --  5.0  --    HGB 8.1* 8.6* 10.2*   HCT 24.8* 26.4* 31.7*   PLT  --  196  --      Recent Labs     11/22/22  0617      K 3.8      CO2 26   GLU 86   BUN 5*   CREA 0.41*   CA 7.8*     No results found for: GLUCPOC  No results for input(s): PH, PCO2, PO2, HCO3, FIO2 in the last 72 hours. No results for input(s): INR, INREXT in the last 72 hours. All Micro Results       None            I have reviewed notes of prior 24hr. Other pertinent lab: Total time spent with patient: 35 minutes I personally reviewed chart, notes, data and current medications in the medical record. I have personally examined and treated the patient at bedside during this period.                  Care Plan discussed with: Patient, Care Manager, Nursing Staff, and >50% of time spent in counseling and coordination of care    Discussed:  Care Plan    Prophylaxis:  Lovenox    Disposition:  Home w/Family           ___________________________________________________    Attending Physician: Toñito Glynn MD

## 2022-11-23 NOTE — PROGRESS NOTES
Comprehensive Nutrition Assessment    Type and Reason for Visit: Initial, RD nutrition re-screen/LOS    Nutrition Recommendations/Plan:   Provide Lower Calorie/High Protein ONS once daily for post-ortho surgery recovery adds 160 kcals & 16 g of protein daily   Continue Regular Diet   Add Bowel Regimen      Malnutrition Assessment:  Malnutrition Status:  No malnutrition (11/23/22 1528)    Context:  Acute illness     Findings of the 6 clinical characteristics of malnutrition:   Energy Intake:  No significant decrease in energy intake  Weight Loss:  Unable to assess     Body Fat Loss:  No significant body fat loss,     Muscle Mass Loss:  No significant muscle mass loss,    Fluid Accumulation:  No significant fluid accumulation,     Strength:  Not performed     Nutrition Assessment:      77year old female admitted for Tibia fracture [S82.773A] who  has a past medical history of Hypertension. RD visited patient and soon after ortho PA arrived to evaluate surgical site. Pt reports to eat a generally healthy diet with few fried foods, small portions of meat, including \"Meatless Mondays\" and red meat once per week or less. She reports a stable healthy weight and no recent weight changes. She notes she hasn't had a BM yet and last documented is PTA. She doesn't feel too uncomfortable when asked. No PRN bowel regimen orders yet. Bed scale weight is inaccurate with large leg brace & leg support on bed. Labs WDL. Edema noted from surgery. Pt is provided a menu and instructions for ordering meals. Nutrition Related Findings:      Wound Type: Surgical incision  Last Bowel Movement Date: 11/19/22 (per patient)  Abdominal Assessment: Intact, Passing flatus  Appetite: Fair  Bowel Sounds: Active     Edema:LLE: 2+; Non-pitting (11/23/2022 12:15 PM)  RLE: No Edema (11/23/2022 12:15 PM)      Nutr.  Labs:  Lab Results   Component Value Date/Time    Creatinine 0.41 (L) 11/22/2022 06:17 AM    BUN 5 (L) 11/22/2022 06:17 AM    Sodium 137 11/22/2022 06:17 AM    Potassium 3.8 11/22/2022 06:17 AM    Chloride 106 11/22/2022 06:17 AM    CO2 26 11/22/2022 06:17 AM     Lab Results   Component Value Date/Time    Glucose 86 11/22/2022 06:17 AM       No results found for: HBA1C, TYE3SRKT, UIC5QRMT, BIW3GKGS    Nutr. Meds:  Lipitor, Lovenox, Diovan  PRN: percocet     Current Nutrition Intake & Therapies:  Average Meal Intake: 51-75%  Average Supplement Intake: None ordered  ADULT DIET Regular    Documented Meal intake:  Patient Vitals for the past 168 hrs:   % Diet Eaten   11/23/22 1250 51 - 75%   11/23/22 0842 26 - 50%   11/23/22 0833 26 - 50%   11/20/22 0923 0%     Documentation of supplement intake:  No data found. Anthropometric Measures:  Height: 5' 5\" (165.1 cm)  Ideal Body Weight (IBW): 125 lbs (57 kg)  Admission Body Weight: 150 lb  Current Body Wt:  68 kg (150 lb), 120 % IBW. Not specified  Current BMI (kg/m2): 25        Weight Adjustment: No adjustment                 BMI Category: Normal weight (BMI 22.0-24.9) age over 72    Wt Readings from Last 10 Encounters:   11/19/22 68 kg (150 lb)   12/27/17 72.1 kg (158 lb 15.2 oz)     Estimated Daily Nutrient Needs:  Energy Requirements Based On: Kcal/kg  Weight Used for Energy Requirements: Admission  Energy (kcal/day): 1700 - 2040 kcal/d (25-30 kcal/kg)  Weight Used for Protein Requirements: Admission  Protein (g/day): 55 - 75 g (0.8-1.1 g/kg)  Method Used for Fluid Requirements: 1 ml/kcal  Fluid (ml/day): 2199-4226 mL/d    Nutrition Diagnosis:   Predicted inadequate energy intake related to acute injury/trauma as evidenced by wounds from ortho surgery with advanced age. Altered GI function related to acute injury/trauma as evidenced by mild constipation, no BM since PTA and new consistent use of pain medications.        Nutrition Interventions:   Food and/or Nutrient Delivery: Continue current diet, Start oral nutrition supplement  Nutrition Education/Counseling: Education initiated  Coordination of Nutrition Care: Continue to monitor while inpatient, Interdisciplinary rounds  Plan of Care discussed with: patient    Goals:     Goals: PO intake 75% or greater, by next RD assessment       Nutrition Monitoring and Evaluation:   Behavioral-Environmental Outcomes: None identified  Food/Nutrient Intake Outcomes: Food and nutrient intake  Physical Signs/Symptoms Outcomes: Biochemical data, GI status, Meal time behavior    Discharge Planning:    Continue oral nutrition supplement    Gladys Kim RD, MS  Contact via [employer] or office [phone]

## 2022-11-23 NOTE — PROGRESS NOTES
Problem: Falls - Risk of  Goal: *Absence of Falls  Description: Document [place] Fall Risk and appropriate interventions in the flowsheet. Outcome: Progressing Towards Goal  Note: Fall Risk Interventions:  Mobility Interventions: Bed/chair exit alarm, Communicate number of staff needed for ambulation/transfer, Patient to call before getting OOB, Utilize walker, cane, or other assistive device, Utilize gait belt for transfers/ambulation         Medication Interventions: Bed/chair exit alarm, Patient to call before getting OOB, Teach patient to arise slowly, Utilize gait belt for transfers/ambulation    Elimination Interventions: Bed/chair exit alarm, Call light in reach, Patient to call for help with toileting needs    History of Falls Interventions: Bed/chair exit alarm, Investigate reason for fall, Room close to nurse's station, Utilize gait belt for transfer/ambulation         Problem: Patient Education: Go to Patient Education Activity  Goal: Patient/Family Education  Outcome: Progressing Towards Goal     Problem: Pain  Goal: *Control of Pain  Outcome: Progressing Towards Goal     Problem: Patient Education: Go to Patient Education Activity  Goal: Patient/Family Education  Outcome: Progressing Towards Goal     Problem: Pressure Injury - Risk of  Goal: *Prevention of pressure injury  Description: Document Taz Scale and appropriate interventions in the flowsheet.   Outcome: Progressing Towards Goal  Note: Pressure Injury Interventions:  Sensory Interventions: Assess changes in LOC, Keep linens dry and wrinkle-free, Maintain/enhance activity level, Minimize linen layers         Activity Interventions: Assess need for specialty bed, Increase time out of bed, PT/OT evaluation    Mobility Interventions: Assess need for specialty bed, HOB 30 degrees or less, PT/OT evaluation    Nutrition Interventions: Offer support with meals,snacks and hydration, Document food/fluid/supplement intake                     Problem: Patient Education: Go to Patient Education Activity  Goal: Patient/Family Education  Outcome: Progressing Towards Goal     Problem: Patient Education: Go to Patient Education Activity  Goal: Patient/Family Education  Outcome: Progressing Towards Goal     Problem: Lower Extremity Fracture:Day of Admission  Goal: Off Pathway (Use only if patient is Off Pathway)  Outcome: Progressing Towards Goal  Goal: Activity/Safety  Outcome: Progressing Towards Goal  Goal: Consults, if ordered  Outcome: Progressing Towards Goal  Goal: Diagnostic Test/Procedures  Outcome: Progressing Towards Goal  Goal: Nutrition/Diet  Outcome: Progressing Towards Goal  Goal: Medications  Outcome: Progressing Towards Goal  Goal: Respiratory  Outcome: Progressing Towards Goal  Goal: Treatments/Interventions/Procedures  Outcome: Progressing Towards Goal  Goal: Psychosocial  Outcome: Progressing Towards Goal  Goal: *Optimal pain control at patient's stated goal  Outcome: Progressing Towards Goal  Goal: *Hemodynamically stable  Outcome: Progressing Towards Goal  Goal: *Adequate oxygenation  Outcome: Progressing Towards Goal     Problem: Lower Extremity Fracture:Day of Surgery (Intiate SCIP Measures for Post-op Care)  Goal: Off Pathway (Use only if patient is Off Pathway)  Outcome: Progressing Towards Goal  Goal: Activity/Safety  Outcome: Progressing Towards Goal  Goal: Consults, if ordered  Outcome: Progressing Towards Goal  Goal: Diagnostic Test/Procedures  Outcome: Progressing Towards Goal  Goal: Nutrition/Diet  Outcome: Progressing Towards Goal  Goal: Medications  Outcome: Progressing Towards Goal  Goal: Respiratory  Outcome: Progressing Towards Goal  Goal: Treatments/Interventions/Procedures  Outcome: Progressing Towards Goal  Goal: Psychosocial  Outcome: Progressing Towards Goal  Goal: *Optimal pain control at patient's stated goal  Outcome: Progressing Towards Goal  Goal: *Hemodynamically stable  Outcome: Progressing Towards Goal  Goal: *Adequate oxygenation  Outcome: Progressing Towards Goal     Problem: Lower Extremity Fracture:Post-op Day 1  Goal: Off Pathway (Use only if patient is Off Pathway)  Outcome: Progressing Towards Goal  Goal: Activity/Safety  Outcome: Progressing Towards Goal  Goal: Consults, if ordered  Outcome: Progressing Towards Goal  Goal: Diagnostic Test/Procedures  Outcome: Progressing Towards Goal  Goal: Nutrition/Diet  Outcome: Progressing Towards Goal  Goal: Discharge Planning  Outcome: Progressing Towards Goal  Goal: Medications  Outcome: Progressing Towards Goal  Goal: Respiratory  Outcome: Progressing Towards Goal  Goal: Treatments/Interventions/Procedures  Outcome: Progressing Towards Goal  Goal: Psychosocial  Outcome: Progressing Towards Goal  Goal: *Optimal pain control at patient's stated goal  Outcome: Progressing Towards Goal  Goal: *Hemodynamically stable  Outcome: Progressing Towards Goal  Goal: *Adequate oxygenation  Outcome: Progressing Towards Goal  Goal: *PT/INR within defined limits  Outcome: Progressing Towards Goal  Goal: *Demonstrates progressive activity  Outcome: Progressing Towards Goal     Problem: Lower Extremity Fracture:Post-op Day 2  Goal: Off Pathway (Use only if patient is Off Pathway)  Outcome: Progressing Towards Goal  Goal: Activity/Safety  Outcome: Progressing Towards Goal  Goal: Diagnostic Test/Procedures  Outcome: Progressing Towards Goal  Goal: Nutrition/Diet  Outcome: Progressing Towards Goal  Goal: Discharge Planning  Outcome: Progressing Towards Goal  Goal: Medications  Outcome: Progressing Towards Goal  Goal: Respiratory  Outcome: Progressing Towards Goal  Goal: Treatments/Interventions/Procedures  Outcome: Progressing Towards Goal  Goal: Psychosocial  Outcome: Progressing Towards Goal  Goal: *Optimal pain control at patient's stated goal  Outcome: Progressing Towards Goal  Goal: *Hemodynamically stable  Outcome: Progressing Towards Goal  Goal: *Adequate oxygenation  Outcome: Progressing Towards Goal  Goal: *PT/INR within defined limits  Outcome: Progressing Towards Goal  Goal: *Demonstrates progressive activity  Outcome: Progressing Towards Goal  Goal: *Voiding  Outcome: Progressing Towards Goal  Goal: *Bowel movement  Outcome: Progressing Towards Goal  Goal: *Tolerating diet  Outcome: Progressing Towards Goal     Problem: Lower Extremity Fracture:Post-op Day 3  Goal: Off Pathway (Use only if patient is Off Pathway)  Outcome: Progressing Towards Goal  Goal: Activity/Safety  Outcome: Progressing Towards Goal  Goal: Diagnostic Test/Procedures  Outcome: Progressing Towards Goal  Goal: Nutrition/Diet  Outcome: Progressing Towards Goal  Goal: Discharge Planning  Outcome: Progressing Towards Goal  Goal: Medications  Outcome: Progressing Towards Goal  Goal: Respiratory  Outcome: Progressing Towards Goal  Goal: Treatments/Interventions/Procedures  Outcome: Progressing Towards Goal  Goal: Psychosocial  Outcome: Progressing Towards Goal  Goal: *Optimal pain control at patient's stated goal  Outcome: Progressing Towards Goal  Goal: *Hemodynamically stable  Outcome: Progressing Towards Goal  Goal: *Adequate oxygenation  Outcome: Progressing Towards Goal  Goal: *PT/INR within defined limits  Outcome: Progressing Towards Goal  Goal: *Demonstrates progressive activity  Outcome: Progressing Towards Goal  Goal: *Voiding  Outcome: Progressing Towards Goal  Goal: *Bowel movement  Outcome: Progressing Towards Goal  Goal: *Tolerating diet  Outcome: Progressing Towards Goal

## 2022-11-23 NOTE — PROGRESS NOTES
Bedside shift change report given to Connie Dobbins RN (oncoming nurse) by Susana Bryson RN (offgoing nurse). Report included the following information SBAR, Kardex, Intake/Output, MAR, and Recent Results.

## 2022-11-24 PROCEDURE — 74011250637 HC RX REV CODE- 250/637: Performed by: PHYSICIAN ASSISTANT

## 2022-11-24 PROCEDURE — 65270000029 HC RM PRIVATE

## 2022-11-24 PROCEDURE — 74011250637 HC RX REV CODE- 250/637: Performed by: INTERNAL MEDICINE

## 2022-11-24 PROCEDURE — 97116 GAIT TRAINING THERAPY: CPT

## 2022-11-24 PROCEDURE — 97530 THERAPEUTIC ACTIVITIES: CPT

## 2022-11-24 PROCEDURE — 74011250636 HC RX REV CODE- 250/636: Performed by: PHYSICIAN ASSISTANT

## 2022-11-24 PROCEDURE — 74011250637 HC RX REV CODE- 250/637: Performed by: HOSPITALIST

## 2022-11-24 RX ADMIN — OXYCODONE AND ACETAMINOPHEN 1 TABLET: 5; 325 TABLET ORAL at 15:35

## 2022-11-24 RX ADMIN — ATORVASTATIN CALCIUM 10 MG: 10 TABLET, FILM COATED ORAL at 22:08

## 2022-11-24 RX ADMIN — OXYCODONE AND ACETAMINOPHEN 1 TABLET: 5; 325 TABLET ORAL at 05:50

## 2022-11-24 RX ADMIN — POLYETHYLENE GLYCOL 3350 17 G: 17 POWDER, FOR SOLUTION ORAL at 19:37

## 2022-11-24 RX ADMIN — OXYCODONE AND ACETAMINOPHEN 1 TABLET: 5; 325 TABLET ORAL at 23:42

## 2022-11-24 RX ADMIN — OXYCODONE AND ACETAMINOPHEN 1 TABLET: 5; 325 TABLET ORAL at 11:56

## 2022-11-24 RX ADMIN — ENOXAPARIN SODIUM 40 MG: 100 INJECTION SUBCUTANEOUS at 10:15

## 2022-11-24 RX ADMIN — POLYETHYLENE GLYCOL 3350 17 G: 17 POWDER, FOR SOLUTION ORAL at 10:15

## 2022-11-24 RX ADMIN — SENNOSIDES AND DOCUSATE SODIUM 1 TABLET: 50; 8.6 TABLET ORAL at 10:15

## 2022-11-24 RX ADMIN — OXYCODONE AND ACETAMINOPHEN 1 TABLET: 5; 325 TABLET ORAL at 19:37

## 2022-11-24 NOTE — PROGRESS NOTES
Bedside shift change report given to Cite Parveen Worley RN (oncoming nurse) by United [place] RN (offgoing nurse). Report included the following information SBAR, Kardex, Intake/Output, MAR, and Recent Results.

## 2022-11-24 NOTE — PROGRESS NOTES
Stefan Olguin [facility] [place] [address]  [address]  [phone]      Hospitalist Progress Note      NAME: Jono Grant   :  1945  MRM:  [id number]    Date of service: 2022  8:50 AM       Assessment and Plan:   Depressed left tibial plateau and fibular head fracture-S/p tibial plateau open reduction internal fixation on . Nonweightbearing left lower extremity with T scope knee brace locked in extension position at all times. Plan for nonweightbearing for 10 to 12 weeks. Cont pain management. PT/OT eval. Lovenox for 30 days if going to SNF, otherwise ASA if discharged home. 2.  Hypertension-Continue valsartan     3. Hyperlipidemia-Continue statin          Subjective:     Chief Complaint[de-identified] Patient was seen and examined as a follow up for tibial fracture. Chart was reviewed. c/o mild pain     ROS:  (bold if positive, if negative)    Tolerating PT  Tolerating Diet        Objective:     Last 24hrs VS reviewed since prior progress note.  Most recent are:    Visit Vitals  /65 (BP 1 Location: Left upper arm, BP Patient Position: At rest)   Pulse 82   Temp 97.3 °F (36.3 °C)   Resp 18   Ht 5' 5\" (1.651 m)   Wt 68 kg (150 lb)   SpO2 97%   BMI 24.96 kg/m²     SpO2 Readings from Last 6 Encounters:   22 97%   17 96%    O2 Flow Rate (L/min): 2 l/min     Intake/Output Summary (Last 24 hours) at 2022 1051  Last data filed at 2022 0928  Gross per 24 hour   Intake 730 ml   Output 1400 ml   Net -670 ml          Physical Exam:    Gen:  Well-developed, well-nourished, in no acute distress  HEENT:  Pink conjunctivae, PERRL, hearing intact to voice, moist mucous membranes  Neck:  Supple, without masses, thyroid non-tender  Resp:  No accessory muscle use, clear breath sounds without wheezes rales or rhonchi  Card:  No murmurs, normal S1, S2 without thrills, bruits or peripheral edema  Abd:  Soft, non-tender, non-distended, normoactive bowel sounds are present, no palpable organomegaly and no detectable hernias  Lymph:  No cervical or inguinal adenopathy  Musc:  No cyanosis or clubbing  Skin:  No rashes or ulcers, skin turgor is good  Neuro:  Cranial nerves are grossly intact, no focal motor weakness, follows commands appropriately  Psych:  Good insight, oriented to person, place and time, alert  __________________________________________________________________  Medications Reviewed: (see below)  Medications:     Current Facility-Administered Medications   Medication Dose Route Frequency    polyethylene glycol (MIRALAX) packet 17 g  17 g Oral BID    senna-docusate (PERICOLACE) 8.6-50 mg per tablet 1 Tablet  1 Tablet Oral DAILY    enoxaparin (LOVENOX) injection 40 mg  40 mg SubCUTAneous Q24H    oxyCODONE-acetaminophen (PERCOCET) 5-325 mg per tablet 2 Tablet  2 Tablet Oral Q4H PRN    Or    oxyCODONE-acetaminophen (PERCOCET) 5-325 mg per tablet 1 Tablet  1 Tablet Oral Q4H PRN    atorvastatin (LIPITOR) tablet 10 mg  10 mg Oral QHS    valsartan (DIOVAN) tablet 160 mg  160 mg Oral DAILY    ondansetron (ZOFRAN) injection 4 mg  4 mg IntraVENous Q6H PRN    HYDROmorphone (DILAUDID) injection 1 mg  1 mg IntraVENous Q4H PRN        Lab Data Reviewed: (see below)  Lab Review:     Recent Labs     11/23/22  0554 11/22/22  0617 11/21/22  1631   WBC  --  5.0  --    HGB 8.1* 8.6* 10.2*   HCT 24.8* 26.4* 31.7*   PLT  --  196  --        Recent Labs     11/22/22  0617      K 3.8      CO2 26   GLU 86   BUN 5*   CREA 0.41*   CA 7.8*       No results found for: GLUCPOC  No results for input(s): PH, PCO2, PO2, HCO3, FIO2 in the last 72 hours. No results for input(s): INR, INREXT, INREXT in the last 72 hours. All Micro Results       None            I have reviewed notes of prior 24hr. Other pertinent lab: Total time spent with patient: 35 minutes I personally reviewed chart, notes, data and current medications in the medical record.   I have personally examined and treated the patient at bedside during this period.                  Care Plan discussed with: Patient, Care Manager, Nursing Staff, and >50% of time spent in counseling and coordination of care    Discussed:  Care Plan    Prophylaxis:  Lovenox    Disposition:  Home w/Family           ___________________________________________________    Attending Physician: Pham Carrizales MD

## 2022-11-24 NOTE — PROGRESS NOTES
Problem: Mobility Impaired (Adult and Pediatric)  Goal: *Acute Goals and Plan of Care (Insert Text)  Description: FUNCTIONAL STATUS PRIOR TO ADMISSION: Patient was independent and active without use of DME.    HOME SUPPORT PRIOR TO ADMISSION: The patient lived with  but did not require assist.    Physical Therapy Goals  Initiated 11/22/2022  1. Patient will move from supine to sit and sit to supine  in bed with supervision/set-up within 7 day(s). 2.  Patient will transfer from bed to chair and chair to bed with minimal assistance/contact guard assist using the least restrictive device within 7 day(s). 3.  Patient will perform sit to stand with minimal assistance/contact guard assist within 7 day(s). 4.  Patient will ambulate with minimal assistance/contact guard assist for 5x2 feet with the least restrictive device within 7 day(s). 5.  Patient will ascend/descend  2 stairs with 1 handrail(s) with moderate assistance  within 7 day(s). Outcome: Progressing Towards Goal   PHYSICAL THERAPY TREATMENT  Patient: Shahriar Burnham (44 y.o. female)  Date: 11/24/2022  Diagnosis: Tibia fracture [S82.209A] <principal problem not specified>  Procedure(s) (LRB):  TIBIAL PLATEAU OPEN REDUCTION INTERNAL FIXATION (Left) 4 Days Post-Op  Precautions: NWB (LLE with brace locked in ext)  Chart, physical therapy assessment, plan of care and goals were reviewed. ASSESSMENT  Patient continues with skilled PT services and is progressing towards goals. Communicated with nurse cleared for therapy. Patient supine on bed when received. Rolled on the edge of bed mod assist x 2, supine to sit mod assist x 2, sit to stand mod assist x 2, ambulate with rolling walker mod assist x 2 towards the recliner. OOB to chair as to chair as tolerated performed some active range of motion exercise on both LE with Left LE brace locked in ext. Activated chair alarm and notified nurses who agreed to monitor patient.      Current Level of Function Impacting Discharge (mobility/balance): mod assist x 2 with bed mobility and transfers using a rolling walker    Other factors to consider for discharge: NWB on Left LE         PLAN :  Patient continues to benefit from skilled intervention to address the above impairments. Continue treatment per established plan of care. to address goals. Recommendation for discharge: (in order for the patient to meet his/her long term goals)  Physical therapy at least 2 days/week in the home     This discharge recommendation:  Has been made in collaboration with the attending provider and/or case management    IF patient discharges home will need the following DME: patient owns DME required for discharge       SUBJECTIVE:   Patient stated [address] 28.     OBJECTIVE DATA SUMMARY:   Critical Behavior:  Neurologic State: Alert, Appropriate for age  Orientation Level: Oriented X4  Cognition: Follows commands  Safety/Judgement: Awareness of environment, Fall prevention, Good awareness of safety precautions, Home safety, Insight into deficits  Functional Mobility Training:  Bed Mobility:  Rolling: Moderate assistance;Assist x2  Supine to Sit: Moderate assistance;Assist x2  Sit to Supine: Moderate assistance;Assist x2  Scooting: Moderate assistance;Assist x2        Transfers:  Sit to Stand: Moderate assistance;Assist x2  Stand to Sit: Moderate assistance;Assist x2  Stand Pivot Transfers: Moderate assistance;Assist x2     Bed to Chair: Moderate assistance;Assist x2                    Balance:  Sitting: Intact; High guard  Standing: Impaired; With support  Standing - Static: Constant support; Fair  Standing - Dynamic : Constant support; Fair  Ambulation/Gait Training:  Distance (ft): 5 Feet (ft)  Assistive Device: Walker, rolling;Gait belt;Brace/Splint  Ambulation - Level of Assistance: Moderate assistance;Assist x2     Gait Description (WDL): Exceptions to WDL  Gait Abnormalities: Path deviations; Step to gait     Left Side Weight Bearing: Non-weight bearing (locked in ext.)  Base of Support: Narrowed     Speed/Deanne: Fluctuations; Slow                            Therapeutic Exercises:   Educate and instructed patient to continue active range of motion exercise on both legs while up on chair or on bed multiple times. Recommend patient to be up on the chair at least 3 times a day every meal times as tolerated. Pain Ratin/10    Activity Tolerance:   Good    After treatment patient left in no apparent distress:   Sitting in chair, Heels elevated for pressure relief, Call bell within reach, and Bed / chair alarm activated    COMMUNICATION/COLLABORATION:   The patients plan of care was discussed with: Registered nurse.      Burak Haddad, PT   Time Calculation: 24 mins

## 2022-11-24 NOTE — PROGRESS NOTES
1030: /65 Notified Dr. Senia welch. 1200: Bedside shift change report given to hannah (oncoming nurse) by Bridgett Puckett (offgoing nurse). Report included the following information SBAR, Kardex, Intake/Output, and Recent Results.

## 2022-11-24 NOTE — PROGRESS NOTES
Bedside shift change report given to Aquilino (oncoming nurse) by Nhung Mcbride (offgoing nurse). Report included the following information SBAR, Kardex, OR Summary, Procedure Summary, Intake/Output, MAR, and Recent Results.

## 2022-11-24 NOTE — PROGRESS NOTES
Problem: Falls - Risk of  Goal: *Absence of Falls  Description: Document Darlen Copeland Fall Risk and appropriate interventions in the flowsheet.   Outcome: Progressing Towards Goal  Note: Fall Risk Interventions:  Mobility Interventions: Bed/chair exit alarm         Medication Interventions: Bed/chair exit alarm    Elimination Interventions: Bed/chair exit alarm    History of Falls Interventions: Bed/chair exit alarm         Problem: Pain  Goal: *Control of Pain  Outcome: Progressing Towards Goal     Problem: Lower Extremity Fracture:Day of Admission  Goal: Activity/Safety  Outcome: Progressing Towards Goal

## 2022-11-25 VITALS
OXYGEN SATURATION: 95 % | DIASTOLIC BLOOD PRESSURE: 74 MMHG | SYSTOLIC BLOOD PRESSURE: 132 MMHG | WEIGHT: 150 LBS | TEMPERATURE: 98.4 F | HEART RATE: 74 BPM | BODY MASS INDEX: 24.99 KG/M2 | RESPIRATION RATE: 18 BRPM | HEIGHT: 65 IN

## 2022-11-25 LAB
ANION GAP SERPL CALC-SCNC: 4 MMOL/L (ref 5–15)
BASOPHILS # BLD: 0 K/UL (ref 0–0.1)
BASOPHILS NFR BLD: 1 % (ref 0–1)
BUN SERPL-MCNC: 6 MG/DL (ref 6–20)
BUN/CREAT SERPL: 15 (ref 12–20)
CALCIUM SERPL-MCNC: 8.7 MG/DL (ref 8.5–10.1)
CHLORIDE SERPL-SCNC: 106 MMOL/L (ref 97–108)
CO2 SERPL-SCNC: 29 MMOL/L (ref 21–32)
CREAT SERPL-MCNC: 0.4 MG/DL (ref 0.55–1.02)
DIFFERENTIAL METHOD BLD: ABNORMAL
EOSINOPHIL # BLD: 0.3 K/UL (ref 0–0.4)
EOSINOPHIL NFR BLD: 6 % (ref 0–7)
ERYTHROCYTE [DISTWIDTH] IN BLOOD BY AUTOMATED COUNT: 12.4 % (ref 11.5–14.5)
GLUCOSE SERPL-MCNC: 107 MG/DL (ref 65–100)
HCT VFR BLD AUTO: 25.3 % (ref 35–47)
HGB BLD-MCNC: 8.3 G/DL (ref 11.5–16)
IMM GRANULOCYTES # BLD AUTO: 0 K/UL (ref 0–0.04)
IMM GRANULOCYTES NFR BLD AUTO: 0 % (ref 0–0.5)
LYMPHOCYTES # BLD: 1.1 K/UL (ref 0.8–3.5)
LYMPHOCYTES NFR BLD: 23 % (ref 12–49)
MCH RBC QN AUTO: 30.5 PG (ref 26–34)
MCHC RBC AUTO-ENTMCNC: 32.8 G/DL (ref 30–36.5)
MCV RBC AUTO: 93 FL (ref 80–99)
MONOCYTES # BLD: 0.5 K/UL (ref 0–1)
MONOCYTES NFR BLD: 12 % (ref 5–13)
NEUTS SEG # BLD: 2.7 K/UL (ref 1.8–8)
NEUTS SEG NFR BLD: 58 % (ref 32–75)
NRBC # BLD: 0 K/UL (ref 0–0.01)
NRBC BLD-RTO: 0 PER 100 WBC
PLATELET # BLD AUTO: 246 K/UL (ref 150–400)
PMV BLD AUTO: 9.2 FL (ref 8.9–12.9)
POTASSIUM SERPL-SCNC: 3.7 MMOL/L (ref 3.5–5.1)
RBC # BLD AUTO: 2.72 M/UL (ref 3.8–5.2)
SODIUM SERPL-SCNC: 139 MMOL/L (ref 136–145)
WBC # BLD AUTO: 4.6 K/UL (ref 3.6–11)

## 2022-11-25 PROCEDURE — 97110 THERAPEUTIC EXERCISES: CPT

## 2022-11-25 PROCEDURE — 74011250636 HC RX REV CODE- 250/636: Performed by: PHYSICIAN ASSISTANT

## 2022-11-25 PROCEDURE — 74011250637 HC RX REV CODE- 250/637: Performed by: HOSPITALIST

## 2022-11-25 PROCEDURE — 85025 COMPLETE CBC W/AUTO DIFF WBC: CPT

## 2022-11-25 PROCEDURE — 77030038269 HC DRN EXT URIN PURWCK BARD -A

## 2022-11-25 PROCEDURE — 80048 BASIC METABOLIC PNL TOTAL CA: CPT

## 2022-11-25 PROCEDURE — 36415 COLL VENOUS BLD VENIPUNCTURE: CPT

## 2022-11-25 PROCEDURE — 97530 THERAPEUTIC ACTIVITIES: CPT

## 2022-11-25 PROCEDURE — 74011250637 HC RX REV CODE- 250/637: Performed by: INTERNAL MEDICINE

## 2022-11-25 PROCEDURE — 74011250637 HC RX REV CODE- 250/637: Performed by: PHYSICIAN ASSISTANT

## 2022-11-25 RX ORDER — ASPIRIN 325 MG
325 TABLET ORAL DAILY
Qty: 30 TABLET | Refills: 0 | Status: SHIPPED | OUTPATIENT
Start: 2022-11-25

## 2022-11-25 RX ORDER — OXYCODONE AND ACETAMINOPHEN 5; 325 MG/1; MG/1
1 TABLET ORAL
Qty: 20 TABLET | Refills: 0 | Status: SHIPPED | OUTPATIENT
Start: 2022-11-25 | End: 2022-11-28

## 2022-11-25 RX ADMIN — ENOXAPARIN SODIUM 40 MG: 100 INJECTION SUBCUTANEOUS at 09:25

## 2022-11-25 RX ADMIN — OXYCODONE AND ACETAMINOPHEN 1 TABLET: 5; 325 TABLET ORAL at 09:21

## 2022-11-25 RX ADMIN — SENNOSIDES AND DOCUSATE SODIUM 1 TABLET: 50; 8.6 TABLET ORAL at 09:24

## 2022-11-25 RX ADMIN — POLYETHYLENE GLYCOL 3350 17 G: 17 POWDER, FOR SOLUTION ORAL at 09:24

## 2022-11-25 RX ADMIN — VALSARTAN 160 MG: 80 TABLET, FILM COATED ORAL at 09:28

## 2022-11-25 RX ADMIN — OXYCODONE AND ACETAMINOPHEN 1 TABLET: 5; 325 TABLET ORAL at 04:08

## 2022-11-25 NOTE — PROGRESS NOTES
Problem: Mobility Impaired (Adult and Pediatric)  Goal: *Acute Goals and Plan of Care (Insert Text)  Description: FUNCTIONAL STATUS PRIOR TO ADMISSION: Patient was independent and active without use of DME.    HOME SUPPORT PRIOR TO ADMISSION: The patient lived with  but did not require assist.    Physical Therapy Goals  Initiated 11/22/2022  1. Patient will move from supine to sit and sit to supine  in bed with supervision/set-up within 7 day(s). 2.  Patient will transfer from bed to chair and chair to bed with minimal assistance/contact guard assist using the least restrictive device within 7 day(s). 3.  Patient will perform sit to stand with minimal assistance/contact guard assist within 7 day(s). 4.  Patient will ambulate with minimal assistance/contact guard assist for 5x2 feet with the least restrictive device within 7 day(s). 5.  Patient will ascend/descend  2 stairs with 1 handrail(s) with moderate assistance  within 7 day(s). Outcome: Progressing Towards Goal  Note:   PHYSICAL THERAPY TREATMENT  Patient: Rinku Lawson (46 y.o. female)  Date: 11/25/2022  Diagnosis: Tibia fracture [S82.209A] <principal problem not specified>  Procedure(s) (LRB):  TIBIAL PLATEAU OPEN REDUCTION INTERNAL FIXATION (Left) 5 Days Post-Op  Precautions: NWB (LLE with brace locked in ext)  Chart, physical therapy assessment, plan of care and goals were reviewed. ASSESSMENT  Patient continues with skilled PT services and progressing towards goals. Pt received in bed, immobilizer in place understands NWB restrictions and immobilizer locked in extension at all times Performed bed level thera ex including ankle pumps,glute sets, QS, modified bridging on RLE. Increased time tho able to come to sitting EOB without physical assistance. CGA for sit>stand using RW. SPT with CGA/ Min A x 1 good maintain of NWB. Limited to SPT at this time 2/2 weakness.  Pt reports having w/c at home with elevating leg rests,with family support to get  up 2 steps, in w/c, into home. Pt has BSC. Hopeful for ramp access to be built early next week. Pt is cleared for discharge from PT standpoint. Current Level of Function Impacting Discharge (mobility/balance): Min A x 1    Other factors to consider for discharge:          PLAN :  Patient continues to benefit from skilled intervention to address the above impairments. Continue treatment per established plan of care. to address goals. Recommendation for discharge: (in order for the patient to meet his/her long term goals)  Physical therapy at least 2 days/week in the home AND ensure assist and/or supervision for safety with SPT    This discharge recommendation:  Has been made in collaboration with the attending provider and/or case management    IF patient discharges home will need the following DME: rolling walker       SUBJECTIVE:   Patient stated doing ok.     OBJECTIVE DATA SUMMARY:   Critical Behavior:  Neurologic State: Alert  Orientation Level: Oriented X4  Cognition: Appropriate decision making  Safety/Judgement: Awareness of environment, Fall prevention, Good awareness of safety precautions, Home safety, Insight into deficits  Functional Mobility Training:  Bed Mobility:                    Transfers:                                   Balance:     Ambulation/Gait Training:                                                        Stairs: Therapeutic Exercises:     Pain Ratin/10    Activity Tolerance:   Good    After treatment patient left in no apparent distress:   Sitting in chair and Call bell within reach    COMMUNICATION/COLLABORATION:   The patients plan of care was discussed with: Registered nurseJovany Hammond   Time Calculation: 38 mins

## 2022-11-25 NOTE — DISCHARGE SUMMARY
Hospitalist Discharge Summary     Patient ID:    Deonna Sharma  [id number]  28 y.o.  1945    Admit date of service: 11/19/2022    Discharge date of service: 11/25/2022    Admission Diagnoses: Tibia fracture [S82.209A]    Chronic Diagnoses:    Problem List as of 11/25/2022 Never Reviewed            Codes Class Noted - Resolved    Tibia fracture ICD-10-CM: S82.209A  ICD-9-CM: 823.80  11/19/2022 - Present           Discharge Medications:   Current Discharge Medication List        START taking these medications    Details   aspirin (ASPIRIN) 325 mg tablet Take 1 Tablet by mouth daily. Qty: 30 Tablet, Refills: 0           CONTINUE these medications which have CHANGED    Details   oxyCODONE-acetaminophen (Percocet) 5-325 mg per tablet Take 1 Tablet by mouth every four (4) hours as needed for Pain for up to 3 days. Max Daily Amount: 6 Tablets. Qty: 20 Tablet, Refills: 0    Associated Diagnoses: Other closed displaced fracture of proximal end of right humerus, initial encounter           CONTINUE these medications which have NOT CHANGED    Details   IRBESARTAN PO Take  by mouth daily. ATORVASTATIN CALCIUM (ATORVASTATIN PO) Take  by mouth nightly. STOP taking these medications       ondansetron (ZOFRAN ODT) 8 mg disintegrating tablet Comments:   Reason for Stopping: Follow up Care:    1. Rachelle Moore MD in 1-2 weeks  2. orthopedics    Diet:  Regular Diet    Disposition:  Home. Advanced Directive:    Discharge Exam:  See today's note. CONSULTATIONS: Orthopedic Surgery    Significant Diagnostic Studies:   Recent Labs     11/25/22  0348 11/23/22  0554   WBC 4.6  --    HGB 8.3* 8.1*   HCT 25.3* 24.8*     --      Recent Labs     11/25/22  0348      K 3.7      CO2 29   BUN 6   CREA 0.40*   *   CA 8.7     No results for input(s): ALT, AP, TBIL, TBILI, TP, ALB, GLOB, GGT, AML, LPSE in the last 72 hours.     No lab exists for component: SGOT, GPT, AMYP, HLPSE  No results for input(s): INR, PTP, APTT, INREXT in the last 72 hours. No results for input(s): FE, TIBC, PSAT, FERR in the last 72 hours. No results for input(s): PH, PCO2, PO2 in the last 72 hours. No results for input(s): CPK, CKMB in the last 72 hours. No lab exists for component: TROPONINI  No results found for: Michelle 57:   Depressed left tibial plateau and fibular head fracture-S/p tibial plateau open reduction internal fixation on 11/20. Nonweightbearing left lower extremity with T scope knee brace locked in extension position at all times. Plan for nonweightbearing for 10 to 12 weeks. Cont pain management. PT/OT eval.  cont ASA      2. Hypertension-Continue valsartan     3. Hyperlipidemia-Continue statin     4. Anemia. Expected after her surgery. FU as outpatient            Discharged in improved condition.     Spent 35 minutes    Signed:  Ivy Meyer MD  11/25/2022  9:13 AM

## 2022-11-25 NOTE — PROGRESS NOTES
11/25/22      Medicare pt has received, reviewed, and signed 2nd IM letter informing them of their right to appeal the discharge. Signed copied has been placed on pt bedside chart.

## 2022-11-25 NOTE — PROGRESS NOTES
PCP  Robert Lopez MD Partner MD not able to schedule follow up appointment practice is closed for the holiday

## 2022-11-25 NOTE — PROGRESS NOTES
I have reviewed discharge instructions with the patient and spouse: reviewed s/s to report, meds (pt's  stated got meds), f/up apptmts. The patient and spouse verbalized understanding.    Instructions e-signed

## 2022-11-25 NOTE — PROGRESS NOTES
Bedside shift change report given to [employer] (oncoming nurse) by Rashmi Griffin (offgoing nurse). Report included the following information SBAR, Kardex, Intake/Output, MAR, Recent Results, and Med Rec Status.

## 2022-11-25 NOTE — PROGRESS NOTES
11/25/2022  Case Management Note    12:03 PM  Per RN patient's  can transport at discharge, however patient waiting to be seen by Ortho. No other needs from  at this time. MATILDA Carcamo    11:59 AM  Noted patient has a discharge order--home health has been set up with [facility]. Attempted to call into patient room to confirm transportation however she did not . Will speak with RN.      MATILDA Carcamo

## 2022-11-25 NOTE — PROGRESS NOTES
[facility]  [address] [facility], [address] [place] [address]  [phone]      Hospitalist Progress Note      NAME: Jono Grant   :  1945  MRM:  [id number]    Date of service: 2022  8:50 AM       Assessment and Plan:   Depressed left tibial plateau and fibular head fracture-S/p tibial plateau open reduction internal fixation on . Nonweightbearing left lower extremity with T scope knee brace locked in extension position at all times. Plan for nonweightbearing for 10 to 12 weeks. Cont pain management. PT/OT eval. Lovenox for 30 days if going to SNF, otherwise ASA if discharged home. 2.  Hypertension-Continue valsartan     3. Hyperlipidemia-Continue statin    4. Anemia. Expected after her surgery. FU as outpatient           Subjective:     Chief Complaint[de-identified] Patient was seen and examined as a follow up for tibial fracture. Chart was reviewed. feels better     ROS:  (bold if positive, if negative)    Tolerating PT  Tolerating Diet        Objective:     Last 24hrs VS reviewed since prior progress note.  Most recent are:    Visit Vitals  /69 (BP 1 Location: Right arm, BP Patient Position: At rest)   Pulse 80   Temp 97.7 °F (36.5 °C)   Resp 17   Ht 5' 5\" (1.651 m)   Wt 68 kg (150 lb)   SpO2 96%   BMI 24.96 kg/m²     SpO2 Readings from Last 6 Encounters:   22 96%   17 96%    O2 Flow Rate (L/min): 2 l/min     Intake/Output Summary (Last 24 hours) at 2022 0853  Last data filed at 2022 0537  Gross per 24 hour   Intake 80 ml   Output 0 ml   Net 80 ml          Physical Exam:    Gen:  Well-developed, well-nourished, in no acute distress  HEENT:  Pink conjunctivae, PERRL, hearing intact to voice, moist mucous membranes  Neck:  Supple, without masses, thyroid non-tender  Resp:  No accessory muscle use, clear breath sounds without wheezes rales or rhonchi  Card:  No murmurs, normal S1, S2 without thrills, bruits or peripheral edema  Abd:  Soft, non-tender, non-distended, normoactive bowel sounds are present, no palpable organomegaly and no detectable hernias  Lymph:  No cervical or inguinal adenopathy  Musc:  No cyanosis or clubbing  Skin:  No rashes or ulcers, skin turgor is good  Neuro:  Cranial nerves are grossly intact, no focal motor weakness, follows commands appropriately  Psych:  Good insight, oriented to person, place and time, alert  __________________________________________________________________  Medications Reviewed: (see below)  Medications:     Current Facility-Administered Medications   Medication Dose Route Frequency    polyethylene glycol (MIRALAX) packet 17 g  17 g Oral BID    senna-docusate (PERICOLACE) 8.6-50 mg per tablet 1 Tablet  1 Tablet Oral DAILY    enoxaparin (LOVENOX) injection 40 mg  40 mg SubCUTAneous Q24H    oxyCODONE-acetaminophen (PERCOCET) 5-325 mg per tablet 2 Tablet  2 Tablet Oral Q4H PRN    Or    oxyCODONE-acetaminophen (PERCOCET) 5-325 mg per tablet 1 Tablet  1 Tablet Oral Q4H PRN    atorvastatin (LIPITOR) tablet 10 mg  10 mg Oral QHS    valsartan (DIOVAN) tablet 160 mg  160 mg Oral DAILY    ondansetron (ZOFRAN) injection 4 mg  4 mg IntraVENous Q6H PRN    HYDROmorphone (DILAUDID) injection 1 mg  1 mg IntraVENous Q4H PRN        Lab Data Reviewed: (see below)  Lab Review:     Recent Labs     11/25/22 0348 11/23/22  0554   WBC 4.6  --    HGB 8.3* 8.1*   HCT 25.3* 24.8*     --        Recent Labs     11/25/22  0348      K 3.7      CO2 29   *   BUN 6   CREA 0.40*   CA 8.7       No results found for: GLUCPOC  No results for input(s): PH, PCO2, PO2, HCO3, FIO2 in the last 72 hours. No results for input(s): INR, INREXT, INREXT in the last 72 hours. All Micro Results       None            I have reviewed notes of prior 24hr. Other pertinent lab: Total time spent with patient: 25 minutes I personally reviewed chart, notes, data and current medications in the medical record.   I have personally examined and treated the patient at bedside during this period.                  Care Plan discussed with: Patient, Care Manager, Nursing Staff, and >50% of time spent in counseling and coordination of care    Discussed:  Care Plan    Prophylaxis:  Lovenox    Disposition:  Home w/Family           ___________________________________________________    Attending Physician: Hanley Lesches, MD

## 2022-11-28 NOTE — PROGRESS NOTES
Physician Progress Note      Toni Peña  CSN #:                  [id number]  :                       1945  ADMIT DATE:       2022 3:26 PM  [address] [place] DATE:        2022 3:00 PM  RESPONDING  PROVIDER #:        Ellyn Coello MD          QUERY TEXT:    72yoF pt admitted with a left Tib/Fib fracture and has anemia, expected after surgery documented. Initial Hgb on admission was 12.6  decreased to 8.3 on discharge. EBL during surgery < 100ml. If possible, please document in progress notes and discharge summary further specificity regarding the acuity and type of anemia:    The medical record reflects the following:  Risk Factors: Left leg fracture  Clinical Indicators: Depressed left tibial plateau and fibular head fracture-S/p tibial plateau open reduction internal fixation on . EBL < 100ml  Hgb of 12.6> 11.6 > 10.2 > 8.6 (() > 8.1 > 8.3 ()     NP Rocio PN:  + serosanguinous drainage noted to ace wrap  Hgb stable at 10.2, dressing reinforced     Ortho/PA Graupman:  Hemodynamics: HgB 8.6. Monitor. ABLA. Treatment: monitor daily H/H. Thank you,  Herminia Phelps RN, CDI    Thank you,  Herminia Phelps RN, CDI  Options provided:  -- Anemia due to acute blood loss  -- Anemia due to chronic blood loss  -- Anemia due to postoperative blood loss  -- Dilutional anemia  -- Other - I will add my own diagnosis  -- Disagree - Not applicable / Not valid  -- Disagree - Clinically unable to determine / Unknown  -- Refer to Clinical Documentation Reviewer    PROVIDER RESPONSE TEXT:    This patient has anemia due to postoperative blood loss.     Query created by: Mark Szymanski on 2022 1:19 PM      Electronically signed by:  Ellyn Coello MD 2022 1:21 PM

## 2022-11-29 NOTE — PROGRESS NOTES
Physician Progress Note      Karri Shepherd  [facility] #:                  [id number]  :                       1945  ADMIT DATE:       2022 3:26 PM  [address] [place] DATE:        2022 3:00 PM  RESPONDING  PROVIDER #:        Hui Echevarria MD          QUERY TEXT:    72yoF pt admitted with a left Tib/Fib fracture. Per query response noted 'anemia due to postoperative blood loss' is documented. Initial Hgb on admission was 12.6  decreased to 8.3 on discharge. EBL during surgery < 100ml. If possible, please further specify whether anemia is acute or chronic blood loss: The medical record reflects the following:  Risk Factors: Left leg fracture, post op surgery  Clinical Indicators: Depressed left tibial plateau and fibular head fracture-S/p tibial plateau open reduction internal fixation on . EBL < 100ml  Hgb of 12.6> 11.6 > 10.2 > 8.6 (() > 8.1 > 8.3 ()  HCt  of 39 () > Hct 25.3 ()    Treatment: monitor daily H/H, frequent wound dressings. Thank you,  Phong Davila RN, CDI  Options provided:  -- Anemia due to Acute postoperative blood loss  -- Anemia due to chronic blood loss  -- Other - I will add my own diagnosis  -- Disagree - Not applicable / Not valid  -- Disagree - Clinically unable to determine / Unknown  -- Refer to Clinical Documentation Reviewer    PROVIDER RESPONSE TEXT:    This patient has anemia due to Acute postoperative blood loss.     Query created by: Ashutosh Sandoval on 2022 9:13 AM      Electronically signed by:  Hui Echevarria MD 2022 9:39 AM

## 2024-08-27 ENCOUNTER — TRANSCRIBE ORDERS (OUTPATIENT)
Facility: HOSPITAL | Age: 79
End: 2024-08-27

## 2024-08-27 ENCOUNTER — HOSPITAL ENCOUNTER (OUTPATIENT)
Facility: HOSPITAL | Age: 79
Discharge: HOME | End: 2024-08-30
Attending: FAMILY MEDICINE
Payer: MEDICARE

## 2024-08-27 DIAGNOSIS — M79.9 SOFT TISSUE DISORDER, UNSPECIFIED: ICD-10-CM

## 2024-08-27 DIAGNOSIS — M79.9 SOFT TISSUE DISORDER, UNSPECIFIED: Primary | ICD-10-CM

## 2024-08-27 PROCEDURE — 93971 EXTREMITY STUDY: CPT

## 2024-09-27 ENCOUNTER — HOSPITAL ENCOUNTER (OUTPATIENT)
Dept: VASCULAR SURGERY | Facility: HOSPITAL | Age: 79
Discharge: HOME | End: 2024-09-29
Attending: FAMILY MEDICINE
Payer: MEDICARE

## 2024-09-27 DIAGNOSIS — I70.90 UNSPECIFIED ATHEROSCLEROSIS: ICD-10-CM

## 2024-09-27 LAB
VAS LEFT CCA DIST EDV: 17.1 CM/S
VAS LEFT CCA DIST PSV: 65.5 CM/S
VAS LEFT CCA PROX EDV: 12.6 CM/S
VAS LEFT CCA PROX PSV: 65.9 CM/S
VAS LEFT ECA EDV: 14.9 CM/S
VAS LEFT ECA PSV: 96.2 CM/S
VAS LEFT ICA DIST EDV: 21.5 CM/S
VAS LEFT ICA DIST PSV: 65.5 CM/S
VAS LEFT ICA MID EDV: 15.9 CM/S
VAS LEFT ICA MID PSV: 40.2 CM/S
VAS LEFT ICA PROX EDV: 19.3 CM/S
VAS LEFT ICA PROX PSV: 58.9 CM/S
VAS LEFT ICA/CCA PSV: 1 NO UNITS
VAS LEFT SUBCLAVIAN PROX EDV: 12.1 CM/S
VAS LEFT SUBCLAVIAN PROX PSV: 138.1 CM/S
VAS LEFT VERTEBRAL EDV: 9.4 CM/S
VAS LEFT VERTEBRAL PSV: 35.8 CM/S
VAS RIGHT CCA DIST EDV: 16 CM/S
VAS RIGHT CCA DIST PSV: 53.4 CM/S
VAS RIGHT CCA PROX EDV: 23 CM/S
VAS RIGHT CCA PROX PSV: 121.5 CM/S
VAS RIGHT ECA EDV: 14.2 CM/S
VAS RIGHT ECA PSV: 110 CM/S
VAS RIGHT ICA DIST EDV: 22.8 CM/S
VAS RIGHT ICA DIST PSV: 63.3 CM/S
VAS RIGHT ICA MID EDV: 21.6 CM/S
VAS RIGHT ICA MID PSV: 65.8 CM/S
VAS RIGHT ICA PROX EDV: 14.2 CM/S
VAS RIGHT ICA PROX PSV: 60.9 CM/S
VAS RIGHT ICA/CCA PSV: 1.2 NO UNITS
VAS RIGHT SUBCLAVIAN PROX EDV: 0 CM/S
VAS RIGHT SUBCLAVIAN PROX PSV: 139.9 CM/S
VAS RIGHT VERTEBRAL EDV: 16 CM/S
VAS RIGHT VERTEBRAL PSV: 46.8 CM/S

## 2024-09-27 PROCEDURE — 93880 EXTRACRANIAL BILAT STUDY: CPT

## (undated) DEVICE — GLOVE SURG SZ 9 L12IN FNGR THK79MIL GRN LTX FREE

## (undated) DEVICE — SPONGE GZ W4XL4IN COT 12 PLY TYP VII WVN C FLD DSGN

## (undated) DEVICE — ZIMMER® STERILE DISPOSABLE TOURNIQUET CUFF WITH PROTECTIVE SLEEVE AND PLC, DUAL PORT, SINGLE BLADDER, 34 IN. (86 CM)

## (undated) DEVICE — CANCELLOUS SCREW
Type: IMPLANTABLE DEVICE | Site: KNEE | Status: NON-FUNCTIONAL
Brand: AXSOS
Removed: 2022-11-20

## (undated) DEVICE — CANISTER, RIGID, 3000CC: Brand: MEDLINE INDUSTRIES, INC.

## (undated) DEVICE — UNTHREADED GUIDE WIRE: Brand: FIXOS

## (undated) DEVICE — K-WIRE DRILL: Brand: VARIAX

## (undated) DEVICE — SUTURE MCRYL SZ 4-0 L27IN ABSRB UD L24MM PS-1 3/8 CIR PRIM Y935H

## (undated) DEVICE — DRILL BIT NON-LOCKING, SHORT: Brand: AXSOS

## (undated) DEVICE — 3M™ IOBAN™ 2 ANTIMICROBIAL INCISE DRAPE 6648EZ: Brand: IOBAN™ 2

## (undated) DEVICE — GLOVE ORANGE PI 8 1/2   MSG9085

## (undated) DEVICE — 1010 S-DRAPE TOWEL DRAPE 10/BX: Brand: STERI-DRAPE™

## (undated) DEVICE — SUTURE VCRL SZ 2-0 L36IN ABSRB UD L40MM CT 1/2 CIR J957H

## (undated) DEVICE — KIRSCHNER WIRE

## (undated) DEVICE — Device

## (undated) DEVICE — BANDAGE COMPR M W6INXL10YD WHT BGE VELC E MTRX HK AND LOOP

## (undated) DEVICE — SURGICAL PROCEDURE PACK ORTH IV ECLIPSE STRL

## (undated) DEVICE — SUTURE VCRL SZ 0 L36IN ABSRB UD L40MM CT 1/2 CIR TAPERPOINT J958H

## (undated) DEVICE — GLOVE SURG SZ 85 L12IN FNGR THK79MIL GRN LTX FREE

## (undated) DEVICE — C-ARM: Brand: UNBRANDED

## (undated) DEVICE — DRILL BIT LOCKING, SHORT: Brand: AXSOS

## (undated) DEVICE — EXTREMITY-SFMCASU: Brand: MEDLINE INDUSTRIES, INC.

## (undated) DEVICE — GLOVE SURG SZ 85 L12IN FNGR ORTHO 126MIL CRM LTX FREE

## (undated) DEVICE — BANDAGE COBAN 4 IN COMPR W4INXL5YD FOAM COHESIVE QUIK STK SELF ADH SFT

## (undated) DEVICE — ADHESIVE SKIN CLSR 0.7ML TOP DERMBND ADV

## (undated) DEVICE — CORTEX SCREW
Type: IMPLANTABLE DEVICE | Site: KNEE | Status: NON-FUNCTIONAL
Brand: AXSOS
Removed: 2022-11-20